# Patient Record
Sex: MALE | Race: WHITE | Employment: FULL TIME | URBAN - METROPOLITAN AREA
[De-identification: names, ages, dates, MRNs, and addresses within clinical notes are randomized per-mention and may not be internally consistent; named-entity substitution may affect disease eponyms.]

---

## 2022-02-28 ENCOUNTER — HOSPITAL ENCOUNTER (INPATIENT)
Age: 57
LOS: 1 days | Discharge: HOME | DRG: 305 | End: 2022-03-01
Attending: EMERGENCY MEDICINE | Admitting: FAMILY MEDICINE
Payer: COMMERCIAL

## 2022-02-28 ENCOUNTER — APPOINTMENT (OUTPATIENT)
Dept: GENERAL RADIOLOGY | Age: 57
DRG: 305 | End: 2022-02-28
Attending: EMERGENCY MEDICINE
Payer: COMMERCIAL

## 2022-02-28 DIAGNOSIS — I16.0 HYPERTENSIVE URGENCY, MALIGNANT: Primary | ICD-10-CM

## 2022-02-28 DIAGNOSIS — R60.9 PERIPHERAL EDEMA: ICD-10-CM

## 2022-02-28 DIAGNOSIS — R77.8 ELEVATED TROPONIN: ICD-10-CM

## 2022-02-28 DIAGNOSIS — I16.0 HYPERTENSIVE URGENCY: ICD-10-CM

## 2022-02-28 DIAGNOSIS — M79.89 SWELLING OF LOWER EXTREMITY: ICD-10-CM

## 2022-02-28 DIAGNOSIS — R06.02 SOB (SHORTNESS OF BREATH): ICD-10-CM

## 2022-02-28 DIAGNOSIS — R60.9 DEPENDENT EDEMA: ICD-10-CM

## 2022-02-28 DIAGNOSIS — I16.1 HYPERTENSIVE EMERGENCY: ICD-10-CM

## 2022-02-28 DIAGNOSIS — R06.01 ORTHOPNEA: ICD-10-CM

## 2022-02-28 DIAGNOSIS — R79.89 ELEVATED BRAIN NATRIURETIC PEPTIDE (BNP) LEVEL: ICD-10-CM

## 2022-02-28 PROBLEM — L91.8 SKIN TAG: Status: ACTIVE | Noted: 2022-02-28

## 2022-02-28 LAB
ALBUMIN SERPL-MCNC: 3.7 G/DL (ref 3.5–5)
ALBUMIN/GLOB SERPL: 1 {RATIO} (ref 1.2–3.5)
ALP SERPL-CCNC: 115 U/L (ref 50–136)
ALT SERPL-CCNC: 41 U/L (ref 12–65)
ANION GAP SERPL CALC-SCNC: 4 MMOL/L (ref 7–16)
AST SERPL-CCNC: 34 U/L (ref 15–37)
ATRIAL RATE: 99 BPM
BASOPHILS # BLD: 0.1 K/UL (ref 0–0.2)
BASOPHILS NFR BLD: 1 % (ref 0–2)
BILIRUB SERPL-MCNC: 3.8 MG/DL (ref 0.2–1.1)
BNP SERPL-MCNC: 3480 PG/ML (ref 5–125)
BUN SERPL-MCNC: 19 MG/DL (ref 6–23)
CALCIUM SERPL-MCNC: 9.1 MG/DL (ref 8.3–10.4)
CALCULATED P AXIS, ECG09: 75 DEGREES
CALCULATED R AXIS, ECG10: -20 DEGREES
CALCULATED T AXIS, ECG11: 90 DEGREES
CHLORIDE SERPL-SCNC: 105 MMOL/L (ref 98–107)
CO2 SERPL-SCNC: 30 MMOL/L (ref 21–32)
CREAT SERPL-MCNC: 1.1 MG/DL (ref 0.8–1.5)
DIAGNOSIS, 93000: NORMAL
DIFFERENTIAL METHOD BLD: ABNORMAL
EOSINOPHIL # BLD: 0.1 K/UL (ref 0–0.8)
EOSINOPHIL NFR BLD: 1 % (ref 0.5–7.8)
ERYTHROCYTE [DISTWIDTH] IN BLOOD BY AUTOMATED COUNT: 13.3 % (ref 11.9–14.6)
GLOBULIN SER CALC-MCNC: 3.6 G/DL (ref 2.3–3.5)
GLUCOSE SERPL-MCNC: 110 MG/DL (ref 65–100)
HCT VFR BLD AUTO: 47.3 % (ref 41.1–50.3)
HGB BLD-MCNC: 16.9 G/DL (ref 13.6–17.2)
IMM GRANULOCYTES # BLD AUTO: 0 K/UL (ref 0–0.5)
IMM GRANULOCYTES NFR BLD AUTO: 0 % (ref 0–5)
LYMPHOCYTES # BLD: 1.6 K/UL (ref 0.5–4.6)
LYMPHOCYTES NFR BLD: 16 % (ref 13–44)
MAGNESIUM SERPL-MCNC: 2.4 MG/DL (ref 1.8–2.4)
MCH RBC QN AUTO: 29.9 PG (ref 26.1–32.9)
MCHC RBC AUTO-ENTMCNC: 35.7 G/DL (ref 31.4–35)
MCV RBC AUTO: 83.6 FL (ref 79.6–97.8)
MONOCYTES # BLD: 0.7 K/UL (ref 0.1–1.3)
MONOCYTES NFR BLD: 7 % (ref 4–12)
NEUTS SEG # BLD: 7.5 K/UL (ref 1.7–8.2)
NEUTS SEG NFR BLD: 76 % (ref 43–78)
NRBC # BLD: 0 K/UL (ref 0–0.2)
P-R INTERVAL, ECG05: 176 MS
PLATELET # BLD AUTO: 224 K/UL (ref 150–450)
PMV BLD AUTO: 10.3 FL (ref 9.4–12.3)
POTASSIUM SERPL-SCNC: 3.5 MMOL/L (ref 3.5–5.1)
PROT SERPL-MCNC: 7.3 G/DL (ref 6.3–8.2)
Q-T INTERVAL, ECG07: 386 MS
QRS DURATION, ECG06: 106 MS
QTC CALCULATION (BEZET), ECG08: 495 MS
RBC # BLD AUTO: 5.66 M/UL (ref 4.23–5.6)
SODIUM SERPL-SCNC: 139 MMOL/L (ref 138–145)
TROPONIN-HIGH SENSITIVITY: 78.5 PG/ML (ref 0–14)
VENTRICULAR RATE, ECG03: 99 BPM
WBC # BLD AUTO: 9.9 K/UL (ref 4.3–11.1)

## 2022-02-28 PROCEDURE — 85025 COMPLETE CBC W/AUTO DIFF WBC: CPT

## 2022-02-28 PROCEDURE — 93005 ELECTROCARDIOGRAM TRACING: CPT | Performed by: EMERGENCY MEDICINE

## 2022-02-28 PROCEDURE — 96375 TX/PRO/DX INJ NEW DRUG ADDON: CPT

## 2022-02-28 PROCEDURE — 96374 THER/PROPH/DIAG INJ IV PUSH: CPT

## 2022-02-28 PROCEDURE — 80053 COMPREHEN METABOLIC PANEL: CPT

## 2022-02-28 PROCEDURE — 74011250636 HC RX REV CODE- 250/636: Performed by: FAMILY MEDICINE

## 2022-02-28 PROCEDURE — 96376 TX/PRO/DX INJ SAME DRUG ADON: CPT

## 2022-02-28 PROCEDURE — 74011000250 HC RX REV CODE- 250: Performed by: EMERGENCY MEDICINE

## 2022-02-28 PROCEDURE — 83735 ASSAY OF MAGNESIUM: CPT

## 2022-02-28 PROCEDURE — 93005 ELECTROCARDIOGRAM TRACING: CPT | Performed by: STUDENT IN AN ORGANIZED HEALTH CARE EDUCATION/TRAINING PROGRAM

## 2022-02-28 PROCEDURE — 99285 EMERGENCY DEPT VISIT HI MDM: CPT

## 2022-02-28 PROCEDURE — 74011250636 HC RX REV CODE- 250/636: Performed by: EMERGENCY MEDICINE

## 2022-02-28 PROCEDURE — 83880 ASSAY OF NATRIURETIC PEPTIDE: CPT

## 2022-02-28 PROCEDURE — 84484 ASSAY OF TROPONIN QUANT: CPT

## 2022-02-28 PROCEDURE — 65270000029 HC RM PRIVATE

## 2022-02-28 PROCEDURE — 71046 X-RAY EXAM CHEST 2 VIEWS: CPT

## 2022-02-28 RX ORDER — METOPROLOL TARTRATE 5 MG/5ML
5 INJECTION INTRAVENOUS ONCE
Status: COMPLETED | OUTPATIENT
Start: 2022-02-28 | End: 2022-02-28

## 2022-02-28 RX ORDER — SODIUM CHLORIDE 0.9 % (FLUSH) 0.9 %
5-40 SYRINGE (ML) INJECTION AS NEEDED
Status: DISCONTINUED | OUTPATIENT
Start: 2022-02-28 | End: 2022-03-01 | Stop reason: HOSPADM

## 2022-02-28 RX ORDER — ACETAMINOPHEN 325 MG/1
650 TABLET ORAL
Status: DISCONTINUED | OUTPATIENT
Start: 2022-02-28 | End: 2022-03-01 | Stop reason: HOSPADM

## 2022-02-28 RX ORDER — SODIUM CHLORIDE 0.9 % (FLUSH) 0.9 %
5-10 SYRINGE (ML) INJECTION EVERY 8 HOURS
Status: DISCONTINUED | OUTPATIENT
Start: 2022-02-28 | End: 2022-03-01 | Stop reason: HOSPADM

## 2022-02-28 RX ORDER — LISINOPRIL 5 MG/1
10 TABLET ORAL DAILY
Status: DISCONTINUED | OUTPATIENT
Start: 2022-03-01 | End: 2022-03-01

## 2022-02-28 RX ORDER — CARVEDILOL 6.25 MG/1
6.25 TABLET ORAL 2 TIMES DAILY WITH MEALS
Status: DISCONTINUED | OUTPATIENT
Start: 2022-03-01 | End: 2022-03-01

## 2022-02-28 RX ORDER — FUROSEMIDE 10 MG/ML
20 INJECTION INTRAMUSCULAR; INTRAVENOUS
Status: COMPLETED | OUTPATIENT
Start: 2022-02-28 | End: 2022-02-28

## 2022-02-28 RX ORDER — HYDRALAZINE HYDROCHLORIDE 20 MG/ML
20 INJECTION INTRAMUSCULAR; INTRAVENOUS ONCE
Status: COMPLETED | OUTPATIENT
Start: 2022-02-28 | End: 2022-02-28

## 2022-02-28 RX ORDER — ONDANSETRON 2 MG/ML
4 INJECTION INTRAMUSCULAR; INTRAVENOUS
Status: DISCONTINUED | OUTPATIENT
Start: 2022-02-28 | End: 2022-03-01 | Stop reason: HOSPADM

## 2022-02-28 RX ORDER — FUROSEMIDE 10 MG/ML
40 INJECTION INTRAMUSCULAR; INTRAVENOUS 2 TIMES DAILY
Status: DISCONTINUED | OUTPATIENT
Start: 2022-03-01 | End: 2022-03-01

## 2022-02-28 RX ORDER — SODIUM CHLORIDE 0.9 % (FLUSH) 0.9 %
5-40 SYRINGE (ML) INJECTION EVERY 8 HOURS
Status: DISCONTINUED | OUTPATIENT
Start: 2022-02-28 | End: 2022-03-01 | Stop reason: HOSPADM

## 2022-02-28 RX ORDER — ASPIRIN 81 MG/1
81 TABLET ORAL DAILY
Status: DISCONTINUED | OUTPATIENT
Start: 2022-03-01 | End: 2022-03-01 | Stop reason: HOSPADM

## 2022-02-28 RX ORDER — ENOXAPARIN SODIUM 100 MG/ML
40 INJECTION SUBCUTANEOUS DAILY
Status: DISCONTINUED | OUTPATIENT
Start: 2022-03-01 | End: 2022-03-01 | Stop reason: HOSPADM

## 2022-02-28 RX ORDER — FUROSEMIDE 10 MG/ML
40 INJECTION INTRAMUSCULAR; INTRAVENOUS 2 TIMES DAILY
Status: DISCONTINUED | OUTPATIENT
Start: 2022-02-28 | End: 2022-02-28

## 2022-02-28 RX ORDER — ACETAMINOPHEN 650 MG/1
650 SUPPOSITORY RECTAL
Status: DISCONTINUED | OUTPATIENT
Start: 2022-02-28 | End: 2022-03-01 | Stop reason: HOSPADM

## 2022-02-28 RX ORDER — SODIUM CHLORIDE 0.9 % (FLUSH) 0.9 %
5-10 SYRINGE (ML) INJECTION AS NEEDED
Status: DISCONTINUED | OUTPATIENT
Start: 2022-02-28 | End: 2022-03-01 | Stop reason: HOSPADM

## 2022-02-28 RX ORDER — POLYETHYLENE GLYCOL 3350 17 G/17G
17 POWDER, FOR SOLUTION ORAL DAILY PRN
Status: DISCONTINUED | OUTPATIENT
Start: 2022-02-28 | End: 2022-03-01 | Stop reason: HOSPADM

## 2022-02-28 RX ORDER — ONDANSETRON 4 MG/1
4 TABLET, ORALLY DISINTEGRATING ORAL
Status: DISCONTINUED | OUTPATIENT
Start: 2022-02-28 | End: 2022-03-01 | Stop reason: HOSPADM

## 2022-02-28 RX ADMIN — METOPROLOL TARTRATE 5 MG: 1 INJECTION, SOLUTION INTRAVENOUS at 21:13

## 2022-02-28 RX ADMIN — FUROSEMIDE 20 MG: 10 INJECTION, SOLUTION INTRAMUSCULAR; INTRAVENOUS at 20:12

## 2022-02-28 RX ADMIN — HYDRALAZINE HYDROCHLORIDE 20 MG: 20 INJECTION INTRAMUSCULAR; INTRAVENOUS at 23:23

## 2022-02-28 RX ADMIN — FUROSEMIDE 40 MG: 10 INJECTION, SOLUTION INTRAMUSCULAR; INTRAVENOUS at 21:45

## 2022-02-28 RX ADMIN — METOPROLOL TARTRATE 5 MG: 1 INJECTION, SOLUTION INTRAVENOUS at 20:12

## 2022-02-28 NOTE — ED TRIAGE NOTES
Pt arrives ambulatory to triage. Reports new swelling in both ankles and having some abdominal distention. Pt went to PCP today and was told his BP was 214/113, denies hx of HTN. Pt denies any headaches, blurred vision. Denies shortness of breath but states at night he has to sleep sitting up to breathe. Pt got OTC diuretic yesterday and states he felt like he was able to sleep better last night. NAD. Masked.

## 2022-03-01 ENCOUNTER — APPOINTMENT (OUTPATIENT)
Dept: ULTRASOUND IMAGING | Age: 57
DRG: 305 | End: 2022-03-01
Attending: FAMILY MEDICINE
Payer: COMMERCIAL

## 2022-03-01 ENCOUNTER — APPOINTMENT (OUTPATIENT)
Dept: NON INVASIVE DIAGNOSTICS | Age: 57
DRG: 305 | End: 2022-03-01
Attending: FAMILY MEDICINE
Payer: COMMERCIAL

## 2022-03-01 VITALS
HEART RATE: 69 BPM | RESPIRATION RATE: 16 BRPM | SYSTOLIC BLOOD PRESSURE: 152 MMHG | HEIGHT: 73 IN | OXYGEN SATURATION: 95 % | DIASTOLIC BLOOD PRESSURE: 73 MMHG | BODY MASS INDEX: 35.39 KG/M2 | WEIGHT: 267 LBS | TEMPERATURE: 98 F

## 2022-03-01 PROBLEM — E80.6 HYPERBILIRUBINEMIA: Status: ACTIVE | Noted: 2022-03-01

## 2022-03-01 PROBLEM — R77.8 ELEVATED TROPONIN: Status: ACTIVE | Noted: 2022-03-01

## 2022-03-01 PROBLEM — R79.89 ELEVATED BRAIN NATRIURETIC PEPTIDE (BNP) LEVEL: Status: ACTIVE | Noted: 2022-03-01

## 2022-03-01 PROBLEM — R60.9 PERIPHERAL EDEMA: Status: ACTIVE | Noted: 2022-03-01

## 2022-03-01 PROBLEM — R06.02 SOB (SHORTNESS OF BREATH): Status: ACTIVE | Noted: 2022-03-01

## 2022-03-01 PROBLEM — E87.70 VOLUME OVERLOAD: Status: ACTIVE | Noted: 2022-03-01

## 2022-03-01 LAB
ALBUMIN SERPL-MCNC: 2.7 G/DL (ref 3.5–5)
ALBUMIN/GLOB SERPL: 1 {RATIO} (ref 1.2–3.5)
ALP SERPL-CCNC: 75 U/L (ref 50–136)
ALT SERPL-CCNC: 25 U/L (ref 12–65)
ANION GAP SERPL CALC-SCNC: 5 MMOL/L (ref 7–16)
AST SERPL-CCNC: 34 U/L (ref 15–37)
BILIRUB DIRECT SERPL-MCNC: 0.3 MG/DL
BILIRUB DIRECT SERPL-MCNC: 0.4 MG/DL
BILIRUB INDIRECT SERPL-MCNC: 3.4 MG/DL (ref 0–1.1)
BILIRUB SERPL-MCNC: 3.1 MG/DL (ref 0.2–1.1)
BILIRUB SERPL-MCNC: 3.8 MG/DL (ref 0.2–1.1)
BUN SERPL-MCNC: 18 MG/DL (ref 6–23)
CALCIUM SERPL-MCNC: 8.6 MG/DL (ref 8.3–10.4)
CHLORIDE SERPL-SCNC: 105 MMOL/L (ref 98–107)
CHOLEST SERPL-MCNC: 169 MG/DL
CO2 SERPL-SCNC: 28 MMOL/L (ref 21–32)
CREAT SERPL-MCNC: 1 MG/DL (ref 0.8–1.5)
ECHO AR MAX VEL PISA: 4.9 M/S
ECHO AV AREA PEAK VELOCITY: 2.5 CM2
ECHO AV AREA VTI: 2.6 CM2
ECHO AV AREA/BSA PEAK VELOCITY: 1 CM2/M2
ECHO AV AREA/BSA VTI: 1.1 CM2/M2
ECHO AV CUSP MM: 2.7 CM
ECHO AV MEAN GRADIENT: 4 MMHG
ECHO AV MEAN VELOCITY: 0.9 M/S
ECHO AV PEAK GRADIENT: 9 MMHG
ECHO AV PEAK VELOCITY: 1.5 M/S
ECHO AV REGURGITANT PHT: 625 MS
ECHO AV VELOCITY RATIO: 0.53
ECHO AV VTI: 27.8 CM
ECHO EST RA PRESSURE: 8 MMHG
ECHO LA AREA 2C: 29.6 CM2
ECHO LA AREA 4C: 26.4 CM2
ECHO LA DIAMETER INDEX: 1.65 CM/M2
ECHO LA DIAMETER: 4 CM
ECHO LA MAJOR AXIS: 6.4 CM
ECHO LA MINOR AXIS: 6.9 CM
ECHO LA VOL BP: 101 ML (ref 18–58)
ECHO LA VOL/BSA BIPLANE: 42 ML/M2 (ref 16–34)
ECHO LV E' LATERAL VELOCITY: 5 CM/S
ECHO LV E' SEPTAL VELOCITY: 5 CM/S
ECHO LV EDV 3D: 310 ML
ECHO LV EDV A4C: 293 ML
ECHO LV EDV INDEX 3D: 128 ML/M2
ECHO LV EDV INDEX A4C: 121 ML/M2
ECHO LV EJECTION FRACTION 3D: 37 %
ECHO LV EJECTION FRACTION A4C: 37 %
ECHO LV ESV 3D: 197 ML
ECHO LV ESV A4C: 185 ML
ECHO LV ESV INDEX 3D: 81 ML/M2
ECHO LV ESV INDEX A4C: 76 ML/M2
ECHO LV FRACTIONAL SHORTENING: 19 % (ref 28–44)
ECHO LV INTERNAL DIMENSION DIASTOLE INDEX: 3 CM/M2
ECHO LV INTERNAL DIMENSION DIASTOLIC: 7.3 CM (ref 4.2–5.9)
ECHO LV INTERNAL DIMENSION SYSTOLIC INDEX: 2.43 CM/M2
ECHO LV INTERNAL DIMENSION SYSTOLIC: 5.9 CM
ECHO LV IVSD: 1.6 CM (ref 0.6–1)
ECHO LV MASS 2D: 667.9 G (ref 88–224)
ECHO LV MASS 3D INDEX: 171.6 G/M2
ECHO LV MASS 3D: 417 G
ECHO LV MASS INDEX 2D: 274.8 G/M2 (ref 49–115)
ECHO LV POSTERIOR WALL DIASTOLIC: 1.7 CM (ref 0.6–1)
ECHO LV RELATIVE WALL THICKNESS RATIO: 0.47
ECHO LVOT AREA: 4.5 CM2
ECHO LVOT AV VTI INDEX: 0.58
ECHO LVOT DIAM: 2.4 CM
ECHO LVOT MEAN GRADIENT: 1 MMHG
ECHO LVOT PEAK GRADIENT: 3 MMHG
ECHO LVOT PEAK VELOCITY: 0.8 M/S
ECHO LVOT STROKE VOLUME INDEX: 30 ML/M2
ECHO LVOT SV: 72.8 ML
ECHO LVOT VTI: 16.1 CM
ECHO MV A VELOCITY: 0.67 M/S
ECHO MV AREA VTI: 1.9 CM2
ECHO MV E DECELERATION TIME (DT): 201 MS
ECHO MV E VELOCITY: 0.99 M/S
ECHO MV E/A RATIO: 1.48
ECHO MV E/E' LATERAL: 19.8
ECHO MV E/E' RATIO (AVERAGED): 19.8
ECHO MV E/E' SEPTAL: 19.8
ECHO MV LVOT VTI INDEX: 2.35
ECHO MV MAX VELOCITY: 1.4 M/S
ECHO MV MEAN GRADIENT: 3 MMHG
ECHO MV MEAN VELOCITY: 0.8 M/S
ECHO MV PEAK GRADIENT: 8 MMHG
ECHO MV VTI: 37.8 CM
ECHO RIGHT VENTRICULAR SYSTOLIC PRESSURE (RVSP): 24 MMHG
ECHO RV BASAL DIMENSION: 3.5 CM
ECHO RV INTERNAL DIMENSION: 2.5 CM
ECHO RV TAPSE: 2.7 CM (ref 1.5–2)
ECHO TV REGURGITANT MAX VELOCITY: 2.01 M/S
ECHO TV REGURGITANT PEAK GRADIENT: 16 MMHG
ERYTHROCYTE [DISTWIDTH] IN BLOOD BY AUTOMATED COUNT: 13.3 % (ref 11.9–14.6)
GLOBULIN SER CALC-MCNC: 2.6 G/DL (ref 2.3–3.5)
GLUCOSE SERPL-MCNC: 138 MG/DL (ref 65–100)
HAV IGM SER QL: NONREACTIVE
HBV CORE IGM SER QL: NONREACTIVE
HBV SURFACE AG SER QL: NONREACTIVE
HCT VFR BLD AUTO: 44.7 % (ref 41.1–50.3)
HCV AB SER QL: NONREACTIVE
HDLC SERPL-MCNC: 49 MG/DL (ref 40–60)
HDLC SERPL: 3.4 {RATIO}
HGB BLD-MCNC: 16.1 G/DL (ref 13.6–17.2)
LDLC SERPL CALC-MCNC: 105.4 MG/DL
MCH RBC QN AUTO: 29.7 PG (ref 26.1–32.9)
MCHC RBC AUTO-ENTMCNC: 36 G/DL (ref 31.4–35)
MCV RBC AUTO: 82.5 FL (ref 79.6–97.8)
NRBC # BLD: 0 K/UL (ref 0–0.2)
PLATELET # BLD AUTO: 237 K/UL (ref 150–450)
PMV BLD AUTO: 10.8 FL (ref 9.4–12.3)
POTASSIUM SERPL-SCNC: 3.4 MMOL/L (ref 3.5–5.1)
PROT SERPL-MCNC: 5.3 G/DL (ref 6.3–8.2)
RBC # BLD AUTO: 5.42 M/UL (ref 4.23–5.6)
SODIUM SERPL-SCNC: 138 MMOL/L (ref 138–145)
TRIGL SERPL-MCNC: 73 MG/DL (ref 35–150)
TROPONIN-HIGH SENSITIVITY: 62.3 PG/ML (ref 0–14)
TROPONIN-HIGH SENSITIVITY: 92.3 PG/ML (ref 0–14)
VLDLC SERPL CALC-MCNC: 14.6 MG/DL (ref 6–23)
WBC # BLD AUTO: 12.7 K/UL (ref 4.3–11.1)

## 2022-03-01 PROCEDURE — 74011000258 HC RX REV CODE- 258: Performed by: FAMILY MEDICINE

## 2022-03-01 PROCEDURE — 74011000250 HC RX REV CODE- 250: Performed by: INTERNAL MEDICINE

## 2022-03-01 PROCEDURE — 80074 ACUTE HEPATITIS PANEL: CPT

## 2022-03-01 PROCEDURE — 74011250637 HC RX REV CODE- 250/637: Performed by: INTERNAL MEDICINE

## 2022-03-01 PROCEDURE — 74011250636 HC RX REV CODE- 250/636: Performed by: FAMILY MEDICINE

## 2022-03-01 PROCEDURE — 74011250637 HC RX REV CODE- 250/637: Performed by: FAMILY MEDICINE

## 2022-03-01 PROCEDURE — 74011000250 HC RX REV CODE- 250: Performed by: FAMILY MEDICINE

## 2022-03-01 PROCEDURE — 80048 BASIC METABOLIC PNL TOTAL CA: CPT

## 2022-03-01 PROCEDURE — 99223 1ST HOSP IP/OBS HIGH 75: CPT | Performed by: INTERNAL MEDICINE

## 2022-03-01 PROCEDURE — 74011250636 HC RX REV CODE- 250/636: Performed by: INTERNAL MEDICINE

## 2022-03-01 PROCEDURE — 80076 HEPATIC FUNCTION PANEL: CPT

## 2022-03-01 PROCEDURE — 80061 LIPID PANEL: CPT

## 2022-03-01 PROCEDURE — 84484 ASSAY OF TROPONIN QUANT: CPT

## 2022-03-01 PROCEDURE — 93970 EXTREMITY STUDY: CPT

## 2022-03-01 PROCEDURE — C8929 TTE W OR WO FOL WCON,DOPPLER: HCPCS

## 2022-03-01 PROCEDURE — 82248 BILIRUBIN DIRECT: CPT

## 2022-03-01 PROCEDURE — 85027 COMPLETE CBC AUTOMATED: CPT

## 2022-03-01 PROCEDURE — 76705 ECHO EXAM OF ABDOMEN: CPT

## 2022-03-01 RX ORDER — POTASSIUM CHLORIDE 750 MG/1
10 TABLET, EXTENDED RELEASE ORAL DAILY
Qty: 30 TABLET | Refills: 11 | Status: SHIPPED | OUTPATIENT
Start: 2022-03-02

## 2022-03-01 RX ORDER — FUROSEMIDE 40 MG/1
40 TABLET ORAL DAILY
Qty: 30 TABLET | Refills: 11 | Status: SHIPPED | OUTPATIENT
Start: 2022-03-01

## 2022-03-01 RX ORDER — LISINOPRIL 20 MG/1
20 TABLET ORAL DAILY
Status: DISCONTINUED | OUTPATIENT
Start: 2022-03-02 | End: 2022-03-01

## 2022-03-01 RX ORDER — NITROGLYCERIN 0.4 MG/1
0.4 TABLET SUBLINGUAL AS NEEDED
Qty: 25 TABLET | Refills: 5 | Status: SHIPPED | OUTPATIENT
Start: 2022-03-01

## 2022-03-01 RX ORDER — CARVEDILOL 12.5 MG/1
12.5 TABLET ORAL 2 TIMES DAILY WITH MEALS
Status: DISCONTINUED | OUTPATIENT
Start: 2022-03-01 | End: 2022-03-01 | Stop reason: HOSPADM

## 2022-03-01 RX ORDER — NITROGLYCERIN 0.4 MG/1
0.4 TABLET SUBLINGUAL AS NEEDED
Status: DISCONTINUED | OUTPATIENT
Start: 2022-03-01 | End: 2022-03-01 | Stop reason: HOSPADM

## 2022-03-01 RX ORDER — POTASSIUM CHLORIDE 750 MG/1
10 TABLET, EXTENDED RELEASE ORAL DAILY
Status: DISCONTINUED | OUTPATIENT
Start: 2022-03-02 | End: 2022-03-01 | Stop reason: HOSPADM

## 2022-03-01 RX ORDER — HYDRALAZINE HYDROCHLORIDE 20 MG/ML
10 INJECTION INTRAMUSCULAR; INTRAVENOUS
Status: DISCONTINUED | OUTPATIENT
Start: 2022-03-01 | End: 2022-03-01 | Stop reason: HOSPADM

## 2022-03-01 RX ORDER — LISINOPRIL 20 MG/1
20 TABLET ORAL DAILY
Status: DISCONTINUED | OUTPATIENT
Start: 2022-03-02 | End: 2022-03-01 | Stop reason: HOSPADM

## 2022-03-01 RX ORDER — LISINOPRIL 20 MG/1
40 TABLET ORAL ONCE
Status: COMPLETED | OUTPATIENT
Start: 2022-03-01 | End: 2022-03-01

## 2022-03-01 RX ORDER — CARVEDILOL 12.5 MG/1
12.5 TABLET ORAL 2 TIMES DAILY WITH MEALS
Qty: 60 TABLET | Refills: 11 | Status: SHIPPED | OUTPATIENT
Start: 2022-03-02 | End: 2022-03-14

## 2022-03-01 RX ORDER — LISINOPRIL 5 MG/1
10 TABLET ORAL ONCE
Status: COMPLETED | OUTPATIENT
Start: 2022-03-01 | End: 2022-03-01

## 2022-03-01 RX ORDER — LISINOPRIL 20 MG/1
20 TABLET ORAL DAILY
Qty: 30 TABLET | Refills: 11 | Status: SHIPPED | OUTPATIENT
Start: 2022-03-01

## 2022-03-01 RX ORDER — POTASSIUM CHLORIDE 750 MG/1
10 TABLET, EXTENDED RELEASE ORAL
Status: COMPLETED | OUTPATIENT
Start: 2022-03-01 | End: 2022-03-01

## 2022-03-01 RX ORDER — FUROSEMIDE 40 MG/1
40 TABLET ORAL DAILY
Status: DISCONTINUED | OUTPATIENT
Start: 2022-03-02 | End: 2022-03-01 | Stop reason: HOSPADM

## 2022-03-01 RX ADMIN — LISINOPRIL 10 MG: 5 TABLET ORAL at 12:05

## 2022-03-01 RX ADMIN — SODIUM CHLORIDE, PRESERVATIVE FREE 10 ML: 5 INJECTION INTRAVENOUS at 00:31

## 2022-03-01 RX ADMIN — SODIUM CHLORIDE, PRESERVATIVE FREE 10 ML: 5 INJECTION INTRAVENOUS at 14:03

## 2022-03-01 RX ADMIN — ENOXAPARIN SODIUM 40 MG: 40 INJECTION SUBCUTANEOUS at 08:00

## 2022-03-01 RX ADMIN — SODIUM CHLORIDE, PRESERVATIVE FREE 10 ML: 5 INJECTION INTRAVENOUS at 05:28

## 2022-03-01 RX ADMIN — LISINOPRIL 40 MG: 20 TABLET ORAL at 01:44

## 2022-03-01 RX ADMIN — POTASSIUM CHLORIDE 10 MEQ: 10 TABLET, EXTENDED RELEASE ORAL at 10:35

## 2022-03-01 RX ADMIN — HYDRALAZINE HYDROCHLORIDE 10 MG: 20 INJECTION, SOLUTION INTRAMUSCULAR; INTRAVENOUS at 10:35

## 2022-03-01 RX ADMIN — LISINOPRIL 10 MG: 5 TABLET ORAL at 08:00

## 2022-03-01 RX ADMIN — FUROSEMIDE 40 MG: 10 INJECTION, SOLUTION INTRAMUSCULAR; INTRAVENOUS at 08:00

## 2022-03-01 RX ADMIN — SODIUM CHLORIDE 15 MG/HR: 9 INJECTION, SOLUTION INTRAVENOUS at 02:17

## 2022-03-01 RX ADMIN — SODIUM CHLORIDE 15 MG/HR: 9 INJECTION, SOLUTION INTRAVENOUS at 00:28

## 2022-03-01 RX ADMIN — SODIUM CHLORIDE 15 MG/HR: 9 INJECTION, SOLUTION INTRAVENOUS at 01:44

## 2022-03-01 RX ADMIN — CARVEDILOL 12.5 MG: 12.5 TABLET, FILM COATED ORAL at 16:13

## 2022-03-01 RX ADMIN — CARVEDILOL 6.25 MG: 6.25 TABLET, FILM COATED ORAL at 06:39

## 2022-03-01 RX ADMIN — ASPIRIN 81 MG: 81 TABLET ORAL at 08:00

## 2022-03-01 RX ADMIN — PERFLUTREN 1 ML: 6.52 INJECTION, SUSPENSION INTRAVENOUS at 10:40

## 2022-03-01 NOTE — PROGRESS NOTES
Primary Nurse Tiffani Diallo RN and Tigist Silveira RN performed a dual skin assessment on this patient No impairment noted  Neno score is 23

## 2022-03-01 NOTE — PROGRESS NOTES
Bedside and verbal shift change report received from  [address] (offgoing nurse). Report included the following information SBAR, Kardex, ED Summary, Procedure Summary, Intake/Output, MAR, Recent Results and Dual Neuro Assessment. Dual skin assessment completed at bedside.     Dual verification of gtts completed (name of gtts verified): N/A
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
## 2022-03-01 NOTE — PROGRESS NOTES
Hospitalist Progress Note   Admit Date:  2022  6:32 PM   Name:  Caterina Lawson   Age:  60 y.o. Sex:  male  :  1965   MRN:  [id number]   Room:  [facility]/01    Presenting Complaint: Hypertension and Peripheral Edema    Reason(s) for Admission: Hypertensive emergency [I16.1]     Hospital Course & Interval History:     Caterina Lawson is a 60 y.o. male with no significant PMHx who from PCP's office with 2 weeks of peripheral edema and 3-4 day duration of SOB on exertion and at night time. Patient went to his PCP  to establish care and was found with /95. In ED, /115. He was given Lasix, metoprolol IV 5 mg x2, hydralazine 20 mg and started on Cardene gtt. Subjective/24hr Events (22):  -leg edema  -sob ,uncontrolled BP    ROS:  10 systems reviewed and negative except as noted above. Assessment & Plan:   Hypertensive emergency  Initial /115 with evidence of end-organ damage with SOB. Increase  Lisinopril  Increase  Coreg  cardene gtt  Cardiology consult appreciated       SOB  Peripheral edema  Suspect 2/2 malignant HTN, ? New-onset CHF. pBNP >3K on admission. CXR shows cardiomegaly  Low sodium diet. Fluid restrictions <1.5L/day. Elevate b/l LE's  Lasix IV 40mg IV BID  Monitor renal function  Accurate I&O's. Low salt diet. Fluid restrictions <1.5L   TTE pending  Check venous duplex b/l LE's       Elevated Troponin  HS-Trop on admission 78.5>>92. EKG on admission. Most likely 2/2 Type 2 demand ischemia due to malignant HTN. Monitor pt on telemetry  HAJA-B therapy  Trend HS-trop  Check TTE  Consult Cardiology, appreciate recs     Hyperbilirubinemia  Tbili 3.8 on admission (no baseline). ALT and AST w/o. Quit alcohol use >20 years ago.   Check hepatitis panel RUQ US  Check fractionated bilirubin        Principal Problem:       Dispo/Discharge Planning:      home    Diet:  ADULT DIET Regular; Low Fat/Low Chol/High Fiber/BRANDEN; No Salt Added (3-4 gm); 1500 ml  DVT PPx: lovenox  Code status: Full Code    Hospital Problems as of 3/1/2022 Never Reviewed          Codes Class Noted - Resolved POA    SOB (shortness of breath) ICD-10-CM: R06.02  ICD-9-CM: 786.05  3/1/2022 - Present Unknown        Elevated troponin ICD-10-CM: R77.8  ICD-9-CM: 790.6  3/1/2022 - Present Unknown        Hyperbilirubinemia ICD-10-CM: E80.6  ICD-9-CM: 782.4  3/1/2022 - Present Unknown        Peripheral edema ICD-10-CM: R60.9  ICD-9-CM: 782.3  3/1/2022 - Present Unknown        Elevated brain natriuretic peptide (BNP) level ICD-10-CM: R79.89  ICD-9-CM: 790.99  3/1/2022 - Present Unknown        Volume overload ICD-10-CM: E87.70  ICD-9-CM: 276.69  3/1/2022 - Present Unknown        * (Principal) Hypertensive emergency ICD-10-CM: I16.1  ICD-9-CM: 401.9  2/28/2022 - Present Unknown              Objective:     Patient Vitals for the past 24 hrs:   Temp Pulse Resp BP SpO2   03/01/22 1502 98 °F (36.7 °C) 77 16 (!) 161/72 97 %   03/01/22 1432 -- 75 -- -- 96 %   03/01/22 1347 -- 76 -- (!) 150/63 96 %   03/01/22 1317 -- 78 -- (!) 159/75 97 %   03/01/22 1302 -- 77 -- (!) 161/69 96 %   03/01/22 1257 -- 70 -- -- 96 %   03/01/22 1217 -- 71 -- (!) 163/78 97 %   03/01/22 1202 -- 78 -- (!) 161/73 96 %   03/01/22 1147 -- 73 -- (!) 158/76 96 %   03/01/22 1135 -- -- -- (!) 165/78 --   03/01/22 1117 98.4 °F (36.9 °C) 70 17 (!) 158/74 95 %   03/01/22 1102 -- 76 -- (!) 152/70 96 %   03/01/22 1048 -- 75 -- (!) 163/88 97 %   03/01/22 1039 -- 73 -- (!) 164/88 97 %   03/01/22 1033 -- 70 -- (!) 168/86 97 %   03/01/22 1013 -- 72 -- -- 96 %   03/01/22 0954 -- 72 -- -- 96 %   03/01/22 0947 -- 65 -- (!) 158/79 95 %   03/01/22 0933 -- 71 -- (!) 157/80 96 %   03/01/22 0917 -- 74 -- (!) 145/75 97 %   03/01/22 0902 -- 64 -- (!) 162/77 94 %   03/01/22 0847 -- 72 -- (!) 172/81 95 %   03/01/22 0834 -- 74 -- (!) 172/91 95 %   03/01/22 0816 -- 70 -- (!) 150/76 96 %   03/01/22 0803 -- 70 -- (!) 146/77 97 %   03/01/22 0750 -- 73 -- (!) 172/82 96 % 03/01/22 0733 -- 79 -- (!) 169/76 97 %   03/01/22 0703 98 °F (36.7 °C) 73 18 (!) 168/77 96 %   03/01/22 0643 -- 82 -- (!) 165/78 94 %   03/01/22 0617 -- 72 -- (!) 158/77 95 %   03/01/22 0602 -- 67 16 (!) 152/66 93 %   03/01/22 0547 -- 76 -- (!) 159/85 95 %   03/01/22 0532 -- 72 -- (!) 158/73 96 %   03/01/22 0517 -- 76 -- (!) 152/72 95 %   03/01/22 0447 -- 76 12 (!) 152/81 96 %   03/01/22 0432 -- 77 -- (!) 150/70 95 %   03/01/22 0417 -- 74 -- (!) 149/66 94 %   03/01/22 0351 -- 77 -- -- --   03/01/22 0347 -- 77 -- (!) 151/68 95 %   03/01/22 0332 -- 79 -- (!) 152/63 96 %   03/01/22 0317 98.8 °F (37.1 °C) 85 20 (!) 154/58 95 %   03/01/22 0302 -- 85 -- (!) 165/67 95 %   03/01/22 0247 -- 84 -- (!) 159/73 95 %   03/01/22 0232 -- 84 -- (!) 155/62 95 %   03/01/22 0217 -- 88 -- (!) 157/71 96 %   03/01/22 0202 -- 87 -- (!) 173/78 95 %   03/01/22 0147 -- 92 -- (!) 183/84 95 %   03/01/22 0132 -- 85 -- (!) 179/84 96 %   03/01/22 0051 98.7 °F (37.1 °C) 83 18 (!) 177/81 96 %   03/01/22 0015 -- 75 -- (!) 195/93 97 %   03/01/22 0000 -- 84 -- -- --   02/28/22 2323 -- 79 -- (!) 202/112 --   02/28/22 2249 -- 77 22 (!) 201/124 --   02/28/22 2234 -- 87 22 (!) 183/125 --   02/28/22 2219 -- 74 22 (!) 188/107 --   02/28/22 2204 -- 76 22 (!) 196/117 --   02/28/22 2149 -- 74 22 (!) 191/119 --   02/28/22 2134 -- 77 (!) 34 (!) 185/106 95 %   02/28/22 2119 -- 70 13 -- 95 %   02/28/22 2113 -- 75 -- (!) 195/109 --   02/28/22 2104 -- 76 14 (!) 195/109 94 %   02/28/22 2049 -- 74 18 (!) 196/122 95 %   02/28/22 2034 -- 75 15 (!) 217/123 95 %   02/28/22 2019 -- 79 13 -- 96 %   02/28/22 2012 -- 91 -- (!) 195/112 --   02/28/22 2004 -- 85 16 (!) 195/112 95 %   02/28/22 1949 -- 86 18 -- 94 %   02/28/22 1934 -- 90 17 (!) 235/117 95 %   02/28/22 1919 -- 90 17 -- 96 %   02/28/22 1904 -- 88 22 -- 96 %   02/28/22 1900 -- 91 22 (!) 208/124 96 %   02/28/22 1803 -- 81 -- (!) 212/112 99 %   02/28/22 1611 98.7 °F (37.1 °C) 99 16 (!) 211/115 99 %     Oxygen Therapy  O2 Sat (%): 97 % (03/01/22 1502)  Pulse via Oximetry: 85 beats per minute (03/01/22 0643)  O2 Device: None (Room air) (03/01/22 1502)    Estimated body mass index is 35.23 kg/m² as calculated from the following:    Height as of this encounter: 6' 1\" (1.854 m). Weight as of this encounter: 121.1 kg (267 lb). Intake/Output Summary (Last 24 hours) at 3/1/2022 1530  Last data filed at 3/1/2022 1507  Gross per 24 hour   Intake 838 ml   Output 3775 ml   Net -2937 ml         Physical Exam:     Blood pressure (!) 161/72, pulse 77, temperature 98 °F (36.7 °C), resp. rate 16, height 6' 1\" (1.854 m), weight 121.1 kg (267 lb), SpO2 97 %. General:    Well nourished. No overt distress  Head:  Normocephalic, atraumatic  Eyes:  Sclerae appear normal.  Pupils equally round. ENT:  Nares appear normal, no drainage. Moist oral mucosa  Neck:  No restricted ROM. Trachea midline   CV:   RRR. No m/r/g. No jugular venous distension. Lungs:   CTAB. No wheezing, rhonchi, or rales. Respirations even, unlabored  Abdomen: Bowel sounds present. Soft, nontender, nondistended. Extremities: No cyanosis or clubbing.  ++ edema  Skin:     No rashes and normal coloration. Warm and dry. Neuro:  CN II-XII grossly intact. Sensation intact. A&Ox3  Psych:  Normal mood and affect.       I have reviewed ordered lab tests and independently visualized imaging below:    Recent Labs:  Recent Results (from the past 48 hour(s))   HEMOGLOBIN A1C WITH EAG    Collection Time: 02/28/22  3:35 PM   Result Value Ref Range    Hemoglobin A1c 5.4 4.8 - 5.6 %    Estimated average glucose 877 mg/dL   METABOLIC PANEL, COMPREHENSIVE    Collection Time: 02/28/22  3:35 PM   Result Value Ref Range    Glucose 113 (H) 65 - 99 mg/dL    BUN 17 6 - 24 mg/dL    Creatinine 1.13 0.76 - 1.27 mg/dL    eGFR 75 >59 mL/min/1.73    BUN/Creatinine ratio 15 9 - 20    Sodium 141 134 - 144 mmol/L    Potassium 4.3 3.5 - 5.2 mmol/L    Chloride 100 96 - 106 mmol/L CO2 25 20 - 29 mmol/L    Calcium 9.3 8.7 - 10.2 mg/dL    Protein, total 6.4 6.0 - 8.5 g/dL    Albumin 4.5 3.8 - 4.9 g/dL    GLOBULIN, TOTAL 1.9 1.5 - 4.5 g/dL    A-G Ratio 2.4 (H) 1.2 - 2.2    Bilirubin, total 3.2 (H) 0.0 - 1.2 mg/dL    Alk. phosphatase 109 44 - 121 IU/L    AST (SGOT) 26 0 - 40 IU/L    ALT (SGPT) 27 0 - 44 IU/L   CBC WITH AUTOMATED DIFF    Collection Time: 02/28/22  3:35 PM   Result Value Ref Range    WBC 9.0 3.4 - 10.8 x10E3/uL    RBC 5.60 4.14 - 5.80 x10E6/uL    HGB 16.9 13.0 - 17.7 g/dL    HCT 49.5 37.5 - 51.0 %    MCV 88 79 - 97 fL    MCH 30.2 26.6 - 33.0 pg    MCHC 34.1 31.5 - 35.7 g/dL    RDW 13.2 11.6 - 15.4 %    PLATELET 800 504 - 161 x10E3/uL    NEUTROPHILS 75 Not Estab. %    Lymphocytes 16 Not Estab. %    MONOCYTES 7 Not Estab. %    EOSINOPHILS 1 Not Estab. %    BASOPHILS 1 Not Estab. %    ABS. NEUTROPHILS 6.8 1.4 - 7.0 x10E3/uL    Abs Lymphocytes 1.4 0.7 - 3.1 x10E3/uL    ABS. MONOCYTES 0.6 0.1 - 0.9 x10E3/uL    ABS. EOSINOPHILS 0.1 0.0 - 0.4 x10E3/uL    ABS. BASOPHILS 0.1 0.0 - 0.2 x10E3/uL    IMMATURE GRANULOCYTES 0 Not Estab. %    ABS. IMM.  GRANS. 0.0 0.0 - 0.1 x10E3/uL   LIPID PANEL    Collection Time: 02/28/22  3:35 PM   Result Value Ref Range    Cholesterol, total 184 100 - 199 mg/dL    Triglyceride 108 0 - 149 mg/dL    HDL Cholesterol 45 >39 mg/dL    VLDL, calculated 20 5 - 40 mg/dL    LDL, calculated 119 (H) 0 - 99 mg/dL   PSA, DIAGNOSTIC (PROSTATE SPECIFIC AG)    Collection Time: 02/28/22  3:35 PM   Result Value Ref Range    Prostate Specific Ag 1.8 0.0 - 4.0 ng/mL   TROPONIN T    Collection Time: 02/28/22  3:35 PM   Result Value Ref Range    Troponin T(Highly Sensitive) 51 (HH) 0 - 22 ng/L   NT-PRO BNP    Collection Time: 02/28/22  3:35 PM   Result Value Ref Range    PROBNP 2,702 (H) 0 - 210 pg/mL   URINALYSIS W/MICROSCOPIC    Collection Time: 02/28/22  3:42 PM   Result Value Ref Range    Specific Gravity 1.025 1.005 - 1.030    pH (UA) 6.0 5.0 - 7.5    Color Yellow Yellow Appearance Clear Clear    Leukocyte Esterase Negative Negative    Protein 2+ (A) Negative/Trace    Glucose Negative Negative    Ketone Trace (A) Negative    Blood Negative Negative    Bilirubin Negative Negative    Urobilinogen 1.0 0.2 - 1.0 mg/dL    Nitrites Negative Negative    Microscopic Examination See additional order    MICROSCOPIC EXAMINATION    Collection Time: 02/28/22  3:42 PM   Result Value Ref Range    WBC 0-5 0 - 5 /hpf    RBC 3-10 (A) 0 - 2 /hpf    Epithelial cells None seen 0 - 10 /hpf    Casts None seen None seen /lpf    Crystals Present (A) N/A    Crystal type Calcium Oxalate N/A    Bacteria None seen None seen/Few   CBC WITH AUTOMATED DIFF    Collection Time: 02/28/22  4:12 PM   Result Value Ref Range    WBC 9.9 4.3 - 11.1 K/uL    RBC 5.66 (H) 4.23 - 5.6 M/uL    HGB 16.9 13.6 - 17.2 g/dL    HCT 47.3 41.1 - 50.3 %    MCV 83.6 79.6 - 97.8 FL    MCH 29.9 26.1 - 32.9 PG    MCHC 35.7 (H) 31.4 - 35.0 g/dL    RDW 13.3 11.9 - 14.6 %    PLATELET 023 366 - 390 K/uL    MPV 10.3 9.4 - 12.3 FL    ABSOLUTE NRBC 0.00 0.0 - 0.2 K/uL    DF AUTOMATED      NEUTROPHILS 76 43 - 78 %    LYMPHOCYTES 16 13 - 44 %    MONOCYTES 7 4.0 - 12.0 %    EOSINOPHILS 1 0.5 - 7.8 %    BASOPHILS 1 0.0 - 2.0 %    IMMATURE GRANULOCYTES 0 0.0 - 5.0 %    ABS. NEUTROPHILS 7.5 1.7 - 8.2 K/UL    ABS. LYMPHOCYTES 1.6 0.5 - 4.6 K/UL    ABS. MONOCYTES 0.7 0.1 - 1.3 K/UL    ABS. EOSINOPHILS 0.1 0.0 - 0.8 K/UL    ABS. BASOPHILS 0.1 0.0 - 0.2 K/UL    ABS. IMM.  GRANS. 0.0 0.0 - 0.5 K/UL   METABOLIC PANEL, COMPREHENSIVE    Collection Time: 02/28/22  4:12 PM   Result Value Ref Range    Sodium 139 138 - 145 mmol/L    Potassium 3.5 3.5 - 5.1 mmol/L    Chloride 105 98 - 107 mmol/L    CO2 30 21 - 32 mmol/L    Anion gap 4 (L) 7 - 16 mmol/L    Glucose 110 (H) 65 - 100 mg/dL    BUN 19 6 - 23 MG/DL    Creatinine 1.10 0.8 - 1.5 MG/DL    GFR est AA >60 >60 ml/min/1.73m2    GFR est non-AA >60 >60 ml/min/1.73m2    Calcium 9.1 8.3 - 10.4 MG/DL    Bilirubin, total 3.8 (H) 0.2 - 1.1 MG/DL    ALT (SGPT) 41 12 - 65 U/L    AST (SGOT) 34 15 - 37 U/L    Alk.  phosphatase 115 50 - 136 U/L    Protein, total 7.3 6.3 - 8.2 g/dL    Albumin 3.7 3.5 - 5.0 g/dL    Globulin 3.6 (H) 2.3 - 3.5 g/dL    A-G Ratio 1.0 (L) 1.2 - 3.5     MAGNESIUM    Collection Time: 02/28/22  4:12 PM   Result Value Ref Range    Magnesium 2.4 1.8 - 2.4 mg/dL   NT-PRO BNP    Collection Time: 02/28/22  4:12 PM   Result Value Ref Range    NT pro-BNP 3,480 (H) 5 - 125 PG/ML   EKG, 12 LEAD, INITIAL    Collection Time: 02/28/22  4:12 PM   Result Value Ref Range    Ventricular Rate 99 BPM    Atrial Rate 99 BPM    P-R Interval 176 ms    QRS Duration 106 ms    Q-T Interval 386 ms    QTC Calculation (Bezet) 495 ms    Calculated P Axis 75 degrees    Calculated R Axis -20 degrees    Calculated T Axis 90 degrees    Diagnosis       !!! Poor data quality, interpretation may be adversely affected  Normal sinus rhythm  Right atrial enlargement  Moderate voltage criteria for LVH, may be normal variant ( Sokolow-Moct[place]uma ,   [place] product )  ST & T wave abnormality, consider lateral ischemia  Prolonged QT  Abnormal ECG  No previous ECGs available  Confirmed by Christiano Day MD (), JJ PALMA ([id number]) on 2/28/2022 5:02:25 PM     TROPONIN-HIGH SENSITIVITY    Collection Time: 02/28/22  4:18 PM   Result Value Ref Range    Troponin-High Sensitivity 78.5 (H) 0 - 14 pg/mL   TROPONIN-HIGH SENSITIVITY    Collection Time: 02/28/22 11:25 PM   Result Value Ref Range    Troponin-High Sensitivity 92.3 (H) 0 - 14 pg/mL   HEPATITIS PANEL, ACUTE    Collection Time: 03/01/22 12:45 AM   Result Value Ref Range    Hepatitis A, IgM NONREACTIVE NR      Hepatitis B core, IgM NONREACTIVE NR      Hep B Surface Ag NONREACTIVE NR      Hepatitis C virus Ab NONREACTIVE NR     BILIRUBIN, FRACTIONATED    Collection Time: 03/01/22 12:47 AM   Result Value Ref Range    Bilirubin, total 3.8 (H) 0.2 - 1.1 MG/DL    Bilirubin, direct 0.4 (H) <0.4 MG/DL    Bilirubin, indirect 3.4 (H) 0.0 - 1.1 MG/DL   METABOLIC PANEL, BASIC    Collection Time: 03/01/22  2:56 AM   Result Value Ref Range    Sodium 138 138 - 145 mmol/L    Potassium 3.4 (L) 3.5 - 5.1 mmol/L    Chloride 105 98 - 107 mmol/L    CO2 28 21 - 32 mmol/L    Anion gap 5 (L) 7 - 16 mmol/L    Glucose 138 (H) 65 - 100 mg/dL    BUN 18 6 - 23 MG/DL    Creatinine 1.00 0.8 - 1.5 MG/DL    GFR est AA >60 >60 ml/min/1.73m2    GFR est non-AA >60 >60 ml/min/1.73m2    Calcium 8.6 8.3 - 10.4 MG/DL   CBC W/O DIFF    Collection Time: 03/01/22  2:56 AM   Result Value Ref Range    WBC 12.7 (H) 4.3 - 11.1 K/uL    RBC 5.42 4.23 - 5.6 M/uL    HGB 16.1 13.6 - 17.2 g/dL    HCT 44.7 41.1 - 50.3 %    MCV 82.5 79.6 - 97.8 FL    MCH 29.7 26.1 - 32.9 PG    MCHC 36.0 (H) 31.4 - 35.0 g/dL    RDW 13.3 11.9 - 14.6 %    PLATELET 759 191 - 587 K/uL    MPV 10.8 9.4 - 12.3 FL    ABSOLUTE NRBC 0.00 0.0 - 0.2 K/uL   TROPONIN-HIGH SENSITIVITY    Collection Time: 03/01/22  2:56 AM   Result Value Ref Range    Troponin-High Sensitivity 62.3 (H) 0 - 14 pg/mL   LIPID PANEL    Collection Time: 03/01/22  2:56 AM   Result Value Ref Range    Cholesterol, total 169 <200 MG/DL    Triglyceride 73 35 - 150 MG/DL    HDL Cholesterol 49 40 - 60 MG/DL    LDL, calculated 105.4 (H) <100 MG/DL    VLDL, calculated 14.6 6.0 - 23.0 MG/DL    CHOL/HDL Ratio 3.4     HEPATIC FUNCTION PANEL    Collection Time: 03/01/22  5:03 AM   Result Value Ref Range    Protein, total 5.3 (L) 6.3 - 8.2 g/dL    Albumin 2.7 (L) 3.5 - 5.0 g/dL    Globulin 2.6 2.3 - 3.5 g/dL    A-G Ratio 1.0 (L) 1.2 - 3.5      Bilirubin, total 3.1 (H) 0.2 - 1.1 MG/DL    Bilirubin, direct 0.3 <0.4 MG/DL    Alk.  phosphatase 75 50 - 136 U/L    AST (SGOT) 34 15 - 37 U/L    ALT (SGPT) 25 12 - 65 U/L   ECHO ADULT COMPLETE    Collection Time: 03/01/22 10:45 AM   Result Value Ref Range    LA Minor Axis 6.9 cm    LA Major Axis 6.4 cm    LA Area 2C 29.6 cm2    LA Area 4C 26.4 cm2    LA Volume  (A) 18 - 58 mL    LA Diameter 4.0 cm    LV EDV A4C 293 mL    LV EDV 3D 310 mL    LV ESV A4C 185 mL    LV ESV 3D 197 mL    EF 3D 37 %    LV Ejection Fraction A4C 37 %    LVOT SV 72.8 ml    LV Mass 3D 417.0 g    IVSd 1.6 (A) 0.6 - 1.0 cm    LVIDd 7.3 (A) 4.2 - 5.9 cm    LVIDs 5.9 cm    LVOT Diameter 2.4 cm    LVOT Mean Gradient 1 mmHg    LVOT VTI 16.1 cm    LVOT Peak Velocity 0.8 m/s    LVOT Peak Gradient 3 mmHg    LVPWd 1.7 (A) 0.6 - 1.0 cm    LV E' Lateral Velocity 5 cm/s    LV E' Septal Velocity 5 cm/s    LVOT Area 4.5 cm2    AR .0 ms    AR Max Velocity PISA 4.9 m/s    AV Peak Velocity 1.5 m/s    AV Peak Gradient 9 mmHg    AV Cusp Mmode 2.7 cm    AV Mean Velocity 0.9 m/s    AV Mean Gradient 4 mmHg    AV VTI 27.8 cm    AV Area by VTI 2.6 cm2    AV Area by Peak Velocity 2.5 cm2    MV E Wave Deceleration Time 201.0 ms    MV A Velocity 0.67 m/s    MV E Velocity 0.99 m/s    MV Mean Gradient 3 mmHg    MV VTI 37.8 cm    MV Mean Velocity 0.8 m/s    MV Max Velocity 1.4 m/s    MV Peak Gradient 8 mmHg    MV Area by VTI 1.9 cm2    RVIDd 2.5 cm    RV Basal Dimension 3.5 cm    TAPSE 2.7 (A) 1.5 - 2.0 cm    TR Max Velocity 2.01 m/s    TR Peak Gradient 16 mmHg    Fractional Shortening 2D 19 28 - 44 %    LV ESV Index 3D 81 mL/m2    LV EDV Index 3D 128 mL/m2    LV ESV Index A4C 76 mL/m2    LV EDV Index A4C 121 mL/m2    LVIDd Index 3.00 cm/m2    LVIDs Index 2.43 cm/m2    LV RWT Ratio 0.47     LV Mass 2D 667.9 (A) 88 - 224 g    LV Mass 2D Index 274.8 (A) 49 - 115 g/m2    LV Mass Index 3D 171.6 g/m2    MV E/A 1.48     E/E' Ratio (Averaged) 19.80     E/E' Lateral 19.80     E/E' Septal 19.80     LA Volume Index BP 42 (A) 16 - 34 ml/m2    LVOT Stroke Volume Index 30.0 mL/m2    LA Size Index 1.65 cm/m2    AV Velocity Ratio 0.53     LVOT:AV VTI Index 0.58     JARON/BSA VTI 1.1 cm2/m2    JARON/BSA Peak Velocity 1.0 cm2/m2    MV:LVOT VTI Index 2.35     Est. RA Pressure 8 mmHg    RVSP 24 mmHg       All Micro Results     None          Other Studies:  XR CHEST PA LAT    Result Date: 2/28/2022  PA LATERAL CHEST  2/28/2022 6:41 PM HISTORY:  SOB  ; SOB COMPARISON: 2/28/2022, 2 hours prior FINDINGS:  The heart size is enlarged. There is no lobar consolidation, pleural effusions or pulmonary edema. Cardiomegaly. XR CHEST PA LAT    Result Date: 2/28/2022  CHEST X-RAY, 2 views. INDICATION:  Shortness of breath during sleep. TECHNIQUE: PA and lateral views. COMPARISON: None. FINDINGS: Lungs: are clear. Costophrenic angles: are sharp. Heart size: Enlarged, CT ratio 21/35. Pulmonary vasculature: is unremarkable. Aorta: Unremarkable. Included portion of the upper abdomen: is unremarkable. Bones: No gross bony lesions. Other: None. Cardiomegaly with cardiothoracic ratio 21/35. No acute congestive heart failure. US ABD LTD    Result Date: 3/1/2022  Limited ultrasound abdomen INDICATION: Elevated bilirubin COMPARISON: none TECHNIQUE: Sonographic grayscale imaging of the abdomen was performed. FINDINGS: Liver is normal in echogenicity, contour and size and measures 15.3 cm. Right kidney is unremarkable without hydronephrosis and measures 12.5 cm. There is no gallbladder wall thickening, pericholecystic fluid or gallstone. No sonographic Carter's sign. No intra or extrahepatic biliary ductal dilatation. Common bile duct measures 4 mm. Pancreas and abdominal aorta are not well seen due to overlying bowel gas. IVC is patent. No evidence of ascites. 1. No cholelithiasis or sonographic evidence of acute cholecystitis. No biliary ductal dilatation. 2. Liver appears within normal limits, by ultrasound imaging. DUPLEX LOWER EXT VENOUS BILAT    Result Date: 3/1/2022  Bilateral lower extremity duplex venous doppler ultrasound INDICATION: Peripheral edema. Evaluate for DVT.  TECHNIQUE: Gray-scale ultrasound with and without compression and color Doppler evaluation were performed of the deep veins of bilateral lower extremities from the level of the common femoral veins to the level of the peroneal and posterior tibial veins. FINDINGS: Bilateral common femoral veins,  femoral veins, posterior tibial and peroneal veins, and popliteal veins are compressible and opacify with color at color Doppler evaluation. There is no evidence of deep vein thrombosis. No evidence of deep venous thrombosis. ECHO ADULT COMPLETE    Result Date: 3/1/2022    Left Ventricle: Left ventricle is moderately dilated. Moderately increased wall thickness. Moderate global hypokinesis present. Moderately reduced left ventricular systolic function with a visually estimated EF of 30 - 35%. Abnormal diastolic function.   Aortic Valve: Moderate transvalvular regurgitation.   Mitral Valve: Mild to moderate transvalvular regurgitation.   Left Atrium: Left atrium is moderately dilated.   Aorta: Mildly dilated aortic root. Mildly dilated ascending aorta.   Technical qualifiers: Echo study was technically difficult due to patient's body habitus.        Current Meds:  Current Facility-Administered Medications   Medication Dose Route Frequency    nitroglycerin (NITROSTAT) tablet 0.4 mg  0.4 mg SubLINGual PRN    carvediloL (COREG) tablet 12.5 mg  12.5 mg Oral BID WITH MEALS    hydrALAZINE (APRESOLINE) 20 mg/mL injection 10 mg  10 mg IntraVENous Q6H PRN    [START ON 3/2/2022] lisinopriL (PRINIVIL, ZESTRIL) tablet 30 mg  30 mg Oral DAILY    sodium chloride (NS) flush 5-10 mL  5-10 mL IntraVENous Q8H    sodium chloride (NS) flush 5-10 mL  5-10 mL IntraVENous PRN    aspirin delayed-release tablet 81 mg  81 mg Oral DAILY    sodium chloride (NS) flush 5-40 mL  5-40 mL IntraVENous Q8H    sodium chloride (NS) flush 5-40 mL  5-40 mL IntraVENous PRN    acetaminophen (TYLENOL) tablet 650 mg  650 mg Oral Q6H PRN    Or    acetaminophen (TYLENOL) suppository 650 mg  650 mg Rectal Q6H PRN    polyethylene glycol (MIRALAX) packet 17 g  17 g Oral DAILY PRN    ondansetron (ZOFRAN ODT) tablet 4 mg  4 mg Oral Q8H PRN    Or    [facility]ron [facility]) injection 4 mg  4 mg IntraVENous Q6H PRN    enoxaparin (LOVENOX) injection 40 mg  40 mg SubCUTAneous DAILY    furosemide (LASIX) injection 40 mg  40 mg IntraVENous BID       Signed:  Aliya Hernandez MD    Part of this note may have been written by using a voice dictation software. The note has been proof read but may still contain some grammatical/other typographical errors.
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
## 2022-03-01 NOTE — PROGRESS NOTES
Pt received from ED. Pt arrived with pt transport with no cardiac monitor or RN. WWK=978 and cardene gtt had not been started. Pt alert and orientedx4. Pt able to ambulate.
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
## 2022-03-01 NOTE — ADT AUTH CERT NOTES
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 Doppler: negative for DVT   T prot 5.3, Alb 2.7, T bili 3.8, 3.1, WBC 12.7, Trop high sensitivity 62.3, .4, K 3.4, Anion gap 5, Glu 138, D bili 0.4, Indirect bili 3.4   ASA 81mg PO QD, Lovenox 40mg QD, Lasix 40mg IV BID, 20mg IV x 1, Cardene 50mg/100cc gtt titrated, Coreg 12.5mg BID, 6.25mg x 1, Apresoline 10mg IV x 1, Lisinopril 10mg x 2, 40mg x 1, KCL 10meq PO x 1, Lopressor 5mg IV x 2   Orders: Card following, regular low fat/chol high fiber BRANDEN diet, oximetry spot check prn, Echo pending, I&O, daily weight, OOB with assistance, 1500cc fluid restriction   Cardiology progress note 3/1/22:   Reason for cardiac evaluation: volume overload, HTN emergency, ?new onset CHF           He is a 60 y.o. male with no significant PMHx but borderline B/Ps in past. Patient went see his new PCP d/t  2 weeks of peripheral edema, abd swelling and SOB. He reports SOB more  at night and would wake him up. Several times he would be sleeping and wake up wheezing and feeling like fluid in chest. Did experience some chest soreness after coughing spell one night. At PCP's office his B/P was  185/95. [facility] was given Rx for HCTZ, lasix, and lisinopril then sent to ED for further care to bring down B/P. He reported family history of MI in his father who passed away in his 45s. [facility] had no prior cardiac work-up.        In ED, /115. Labs showed  proBNP 3480.  Trop-HS 78.5.  CXR shows cardiomegaly without acute CHF.  US with 2+ protein. He was given Lasix, metoprolol IV 5 mg x2, hydralazine 20 mg and started on Cardene gtt. Hospitalist admitted to ICU for HTN emergency. He was continued on cardene gtt and IV lasix. Echo ordered. Cardiology was consulted for volume overload, HTN emergency and ?new onset CHF.  He has diuresed 1.6 liters overnight. His repeat trop were 92.3 then 62. 3.        General: Well Developed, Well Nourished, No Acute Distress   HEENT: pupils equal and round, no abnormalities noted   Neck: supple, no JVD, no carotid bruits   Heart: S1S2 with RRR without murmurs or gallops   Lungs: Clear throughout auscultation bilaterally without adventitious sounds   Abd: soft, nontender, nondistended, with good bowel sounds   Ext: warm, 1+ bilateral ankle edema, calves supple/nontender, pulses 2+ bilaterally   Skin: warm and dry   Psychiatric: Normal mood and affect   Neurologic: Alert and oriented X 3       Cardiographics       Telemetry: SR 70s   ECG: Normal sinus rhythm    Right atrial enlargement    Moderate voltage criteria for LVH   Echocardiogram: pending    Assessment/Plan:        Assessment:       Principal Problem:     Hypertensive emergency (2/28/2022)- cardene gtt off; B/P improved. Continue coreg, ACE and lasix. Chest xray with cardiomegaly.  Further recommendations post echo.        Active Problems:     SOB (shortness of breath) (3/1/2022)- improved; diuresed 1.6 liters overnight.          Elevated troponin (3/1/2022)- mildly elevated; denies any chest pain except after coughing spell. Echo as above.          Hyperbilirubinemia (3/1/2022)         Peripheral edema (3/1/2022)- see above         Elevated brain natriuretic peptide (BNP) level (3/1/2022)- see above         Volume overload (3/1/2022)- from ? uncontrolled HTN/diastolic issues, systolic issues, or right sided issues ? ALANA. Continue coreg, lisinopril and IV lasix. (reports gasping for breath at night, ?sleep eval needed outpatient.  Further recommendations post echo.               Hypertension - Care Day 1 (2/28/2022) by Rashida Meehan       Review Entered Review Status   3/1/2022 10:19 Completed      Criteria Review      Care Day: 1 Care Date: 2/28/2022 Level of Care: ICU    Guideline Day 1    Level Of Care    (X) ICU, intermediate care, or floor    3/1/2022 10:19:17 EST by Rashida Meehan      ICU    Clinical Status    (X) * Clinical Indications met    (X) Possible chest pain, dyspnea, blurred vision, change in sensorium    3/1/2022 10:19:17 EST by Rashida Meehan      SOB with exertion    Routes    (X) Oral diet as tolerated    3/1/2022 10:19:17 EST by Rashida Meehan      regular low fat/chol high fiber BRANDEN diet    Interventions    (X) Possible cardiac monitoring    Medications    (X) Oral or parenteral antihypertensives    3/1/2022 10:19:17 EST by Rashida Meehan      Coreg, Apresoline & Lopressor IV, Cardene gtt    * Milestone   Additional Notes   Inpatient admission to ICU 2/28/22   HTN emergency   Wt: 121.1kg   98.7-90-/117, 96% on RA   CXR: Cardiomegaly   Trop high sensitivity 78.5, 92.3, Anion gap 4, Glu 110, T bili 3.8, BNP 3480   ASA 81mg PO QD, Lovenox 40mg QD, Lasix 40mg IV BID, 20mg IV x 1, Cardene 50mg/100cc gtt titrated, Coreg 12.5mg BID, Apresoline 20mg IV x 1, Lisinopril 40mg x 1, Lopressor 5mg IV x 2   Orders: Card consult, regular low fat/chol high fiber BRANDEN diet, oximetry spot check prn, Echo pending, I&O, daily weight, OOB with assistance, 1500cc fluid restriction   Presenting Complaint: Hypertension and Peripheral Edema       Reason(s) for Admission: Hypertensive emergency [I16.1]        History of Present Illness:   He is a 60 y.o. male with no significant PMHx who from PCP's office with 2 weeks of peripheral edema and 3-4 day duration of SOB on exertion and at night time.  Patient went to his PCP 2/28 to establish care and was found with /95.  He was given HCTZ and lisinopril and was instructed to come to the ER due to symptoms.  SOB worse with exertion and laying down.  Prior to this episode, he denies taking any medications at home and had seen a doctor in the past. Eufemia Forte reported family history of MI in his father who passed away in his 45s. Eufemia Forte had no prior cardiac work-up.  He denies fever, chills, abdominal pain, nausea, vomiting, diarrhea, constipation.       In ED, /115. [address] unremarkable.  CMP with T bili 3.8.  proBNP 3480.  Trop-HS 78.5.  EKG shows NSR, nonspecific ST abnormalities; QTc prolongation.  CXR shows cardiomegaly without acute CHF.  He was given Lasix, metoprolol IV 5 mg x2, hydralazine 20 mg and started on Cardene gtt.        General:          Well nourished.  No overt distress   Head:               Normocephalic, atraumatic   Eyes:               Sclerae appear normal.  Pupils equally round. ENT:                Nares appear normal, no drainage.  Moist oral mucosa   Neck:               No restricted ROM.  Trachea midline    CV:                  RRR.  No m/r/g.  No jugular venous distension. Lungs:             CTAB.  No wheezing, rhonchi, or rales.  Respirations even, unlabored   Abdomen:        Bowel sounds present.  Soft, nontender, nondistended. Extremities:     No cyanosis or clubbing.  2+ pitting edema in b/l LE's up to knees   Skin:                No rashes and normal coloration.   Warm and dry.     Neuro:             CN II-XII grossly intact.  Sensation intact.  A&Ox3   Psych:             Normal mood and affect.         Assessment & Plan:       Hypertensive emergency   Initial /115 with evidence of end-organ damage with SOB. Cont home Lisinopril   Add Coreg   Start Cardene gtt   Avoid lowering BP too quickly   Goal to lower MAP by 10-20% in first hour then gradually by further 5-15% over next 23h. Target BP of <180/120 for first hour and <160/110 for next 23       SOB   Peripheral edema   Suspect 2/2 malignant HTN, ? New-onset CHF. pBNP >3K on admission. CXR shows cardiomegaly   Low sodium diet. Fluid restrictions <1.5L/day. Elevate b/l LE's   Lasix IV 40mg IV BID   Monitor renal function   Accurate I&O's. Low salt diet. Fluid restrictions <1.5L    TTE pending   Check venous duplex b/l LE's   Consult Cardiology, appreciate recs       Elevated Troponin   HS-Trop on admission 78.5>>92. EKG on admission. Most likely 2/2 Type 2 demand ischemia due to malignant HTN. Monitor pt on telemetry   HAJA-B therapy   Trend HS-trop   Check TTE   Consult Cardiology, appreciate recs       Hyperbilirubinemia   Tbili 3.8 on admission (no baseline). ALT and AST w/o. Quit alcohol use >20 years ago.    Check hepatitis panel RUQ US   Check fractionated bilirubin             Dispo/Discharge Planning:      >2 midnights.       Patient is critically ill.  Without intervention, there is a high probability of acute organ impairment or life-threatening deterioration in the patient's condition from: Hypertensive emergency   Critical care interventions: Per above   Total critical care time spent: 35 minutes.     Time is indicative of direct patient attendance at bedside and on the patient's floor nearby.  Includes time spent at bedside performing history and exam, performing chart review, discussing findings and treatment plan with patient and/or family, discussing patient with consultants and colleagues, ordering and reviewing pertinent laboratory and radiographic evaluations, and discussing patient with nursing staff.  Time excludes procedures.           Diet: ADULT DIET Regular; Low Fat/Low Chol/High Fiber/BRANDEN; No Salt Added (3-4 gm); 1500 ml   VTE ppx: Lovenox SQ              Hypertension - Clinical Indications for Admission to Inpatient Care by Theresa Clayton       Review Entered Review Status   3/1/2022 10:14 Completed      Criteria Review      Clinical Indications for Admission to Inpatient Care    Most Recent : Theresa Clayton Most Recent Date: 3/1/2022 10:14:44 EST    (X) Admission is indicated for  1 or more  of the following :       (X) Hypertensive emergency indicated by SBP greater than 180 mm Hg or DBP greater than 120 mm       Hg with evidence of acute or worsening target organ damage, as indicated by  1 or more        of the following  (1) (2):          (X) Heart failure (eg, pulmonary edema). See Heart Failure guideline.

## 2022-03-01 NOTE — ACP (ADVANCE CARE PLANNING)
[facility]  At the heart of better care     Advance Care Planning   Admit Date:  2022  6:32 PM   Name:  Iram Rincon   Age:  60 y.o. Sex:  male  :  1965   MRN:  [id number]   Room:  [facility]    Iram Rincon is able to make his own decisions: Yes    If pt unable to make decisions, POA/surrogate decision maker:  Daughter Yasmin Mcwilliams      Patient / surrogate decision-maker directed:  Code Status: FULL CODE -full aggressive medical and surgical interventions, including intubations, resuscitations, pressors, artificial tube feeding    Patient or surrogate consented to discussion of the current conditions, workup, management plans, prognosis, and understand the risk for further deterioration. Time spent: 17 minutes in direct discussion (face to face and/or over phone). Signed:   Curry Hernandez DO

## 2022-03-01 NOTE — PROGRESS NOTES
Pt admitted to ICU OFL with hypertensive emergency. ECHO completed/Cardiology consulted. Chart screened by  for discharge planning. No needs identified at this time. Please consult  if any new issues arise. Care Management Interventions  PCP Verified by CM: Yes Johnnie Basilio)  Last Visit to PCP: 02/28/22  Mode of Transport at Discharge: Other (see comment) (Pt drove from MD office to ER.  Likely to drive home at discharge.)  Discharge Durable Medical Equipment: No  Physical Therapy Consult: No  Occupational Therapy Consult: No  Support Systems: Child(demarco)  Confirm Follow Up Transport: Self  Discharge Location  Patient Expects to be Discharged to[de-identified] Home

## 2022-03-01 NOTE — DISCHARGE SUMMARY
[facility] Cardiology Discharge Summary     Patient ID:  Iram Rincon  [id number]  73 y.o.  1965    Admit date: 2/28/2022    Discharge date:  03/01/2022    Admitting Physician: Curry Hernandez DO     Discharge Physician: Kisha Jules NP/Dr. Hackett    Admission Diagnoses: Hypertensive emergency [I16.1]    Discharge Diagnoses:    Diagnosis    SOB (shortness of breath)    Elevated troponin    Peripheral edema    Elevated brain natriuretic peptide (BNP) level    Volume overload    Hypertensive urgency    Hypertensive emergency       Cardiology Procedures this admission:  EchoCardiogram  Consults: None    Hospital Course: Patient presented to the ER from PCP with 2 weeks of peripheral edema, abd swelling and SOB. He reported SOB more  at night and would wake him up. Several times he would be sleeping and wake up wheezing and feeling like fluid in chest. Did experience some chest soreness after coughing spell one night. At PCP's office his B/P was  185/95.  He was given Rx for HCTZ, lasix, and lisinopril then sent to ED for further care to bring down B/P. In ED, /115. Labs showed  proBNP 3480.  Trop-HS 78. 5.  CXR shows cardiomegaly without acute CHF.  US with 2+ protein. He was given Lasix, metoprolol IV 5 mg x2, hydralazine 20 mg and started on Cardene gtt. Hospitalist admitted to ICU for HTN emergency. He was continued on cardene gtt and IV lasix. Patient was started on PO meds for BP with improvement and cardene drip was weaned off. IVP lasix was converted to PO. He was -2.9 at time of discharge. Patient will have BMP in one week. Echo results:    Left Ventricle: Left ventricle is moderately dilated. Moderately increased wall thickness. Moderate global hypokinesis present. Moderately reduced left ventricular systolic function with a visually estimated EF of 30 - 35%. Abnormal diastolic function.   Aortic Valve: Moderate transvalvular regurgitation.     Mitral Valve: Mild to moderate transvalvular regurgitation.   Left Atrium: Left atrium is moderately dilated.   Aorta: Mildly dilated aortic root. Mildly dilated ascending aorta.   Technical qualifiers: Echo study was technically difficult due to patient's body habitus. At the time of discharge, patient was up feeling well without any complaints of chest pain or shortness of breath. .  For maximized medical therapy for CHF, patient will continue ACE and Beta Blocker  The patient will follow up with [facility] -- Dr. De La Cruz Link on 3/14 @ [phone] in the [place] office. Patient has been referred to cardiac rehab. DISPOSITION: The patient is being discharged home in stable condition on a low saturated fat, low cholesterol and low salt diet. The patient is instructed to advance activities as tolerated to the limit of fatigue or shortness of breath. The patient is instructed to call the office or return to the ER for immediate evaluation for any shortness of breath or chest pain not relieved by NTG. Discharge Exam:   Visit Vitals  BP (!) 160/83   Pulse 69   Temp 98 °F (36.7 °C)   Resp 16   Ht 6' 1\" (1.854 m)   Wt 121.1 kg (267 lb)   SpO2 95%   BMI 35.23 kg/m²     Patient has been seen by Dr. De La Cruz Link: see his progress note for exam details.     Recent Results (from the past 24 hour(s))   TROPONIN-HIGH SENSITIVITY    Collection Time: 02/28/22 11:25 PM   Result Value Ref Range    Troponin-High Sensitivity 92.3 (H) 0 - 14 pg/mL   HEPATITIS PANEL, ACUTE    Collection Time: 03/01/22 12:45 AM   Result Value Ref Range    Hepatitis A, IgM NONREACTIVE NR      Hepatitis B core, IgM NONREACTIVE NR      Hep B Surface Ag NONREACTIVE NR      Hepatitis C virus Ab NONREACTIVE NR     BILIRUBIN, FRACTIONATED    Collection Time: 03/01/22 12:47 AM   Result Value Ref Range    Bilirubin, total 3.8 (H) 0.2 - 1.1 MG/DL    Bilirubin, direct 0.4 (H) <0.4 MG/DL    Bilirubin, indirect 3.4 (H) 0.0 - 1.1 MG/DL   METABOLIC PANEL, BASIC Collection Time: 03/01/22  2:56 AM   Result Value Ref Range    Sodium 138 138 - 145 mmol/L    Potassium 3.4 (L) 3.5 - 5.1 mmol/L    Chloride 105 98 - 107 mmol/L    CO2 28 21 - 32 mmol/L    Anion gap 5 (L) 7 - 16 mmol/L    Glucose 138 (H) 65 - 100 mg/dL    BUN 18 6 - 23 MG/DL    Creatinine 1.00 0.8 - 1.5 MG/DL    GFR est AA >60 >60 ml/min/1.73m2    GFR est non-AA >60 >60 ml/min/1.73m2    Calcium 8.6 8.3 - 10.4 MG/DL   CBC W/O DIFF    Collection Time: 03/01/22  2:56 AM   Result Value Ref Range    WBC 12.7 (H) 4.3 - 11.1 K/uL    RBC 5.42 4.23 - 5.6 M/uL    HGB 16.1 13.6 - 17.2 g/dL    HCT 44.7 41.1 - 50.3 %    MCV 82.5 79.6 - 97.8 FL    MCH 29.7 26.1 - 32.9 PG    MCHC 36.0 (H) 31.4 - 35.0 g/dL    RDW 13.3 11.9 - 14.6 %    PLATELET 960 572 - 564 K/uL    MPV 10.8 9.4 - 12.3 FL    ABSOLUTE NRBC 0.00 0.0 - 0.2 K/uL   TROPONIN-HIGH SENSITIVITY    Collection Time: 03/01/22  2:56 AM   Result Value Ref Range    Troponin-High Sensitivity 62.3 (H) 0 - 14 pg/mL   LIPID PANEL    Collection Time: 03/01/22  2:56 AM   Result Value Ref Range    Cholesterol, total 169 <200 MG/DL    Triglyceride 73 35 - 150 MG/DL    HDL Cholesterol 49 40 - 60 MG/DL    LDL, calculated 105.4 (H) <100 MG/DL    VLDL, calculated 14.6 6.0 - 23.0 MG/DL    CHOL/HDL Ratio 3.4     HEPATIC FUNCTION PANEL    Collection Time: 03/01/22  5:03 AM   Result Value Ref Range    Protein, total 5.3 (L) 6.3 - 8.2 g/dL    Albumin 2.7 (L) 3.5 - 5.0 g/dL    Globulin 2.6 2.3 - 3.5 g/dL    A-G Ratio 1.0 (L) 1.2 - 3.5      Bilirubin, total 3.1 (H) 0.2 - 1.1 MG/DL    Bilirubin, direct 0.3 <0.4 MG/DL    Alk.  phosphatase 75 50 - 136 U/L    AST (SGOT) 34 15 - 37 U/L    ALT (SGPT) 25 12 - 65 U/L   ECHO ADULT COMPLETE    Collection Time: 03/01/22 10:45 AM   Result Value Ref Range    LA Minor Axis 6.9 cm    LA Major Axis 6.4 cm    LA Area 2C 29.6 cm2    LA Area 4C 26.4 cm2    LA Volume  (A) 18 - 58 mL    LA Diameter 4.0 cm    LV EDV A4C 293 mL    LV EDV 3D 310 mL    LV ESV A4C 185 mL    LV ESV 3D 197 mL    EF 3D 37 %    LV Ejection Fraction A4C 37 %    LVOT SV 72.8 ml    LV Mass 3D 417.0 g    IVSd 1.6 (A) 0.6 - 1.0 cm    LVIDd 7.3 (A) 4.2 - 5.9 cm    LVIDs 5.9 cm    LVOT Diameter 2.4 cm    LVOT Mean Gradient 1 mmHg    LVOT VTI 16.1 cm    LVOT Peak Velocity 0.8 m/s    LVOT Peak Gradient 3 mmHg    LVPWd 1.7 (A) 0.6 - 1.0 cm    LV E' Lateral Velocity 5 cm/s    LV E' Septal Velocity 5 cm/s    LVOT Area 4.5 cm2    AR .0 ms    AR Max Velocity PISA 4.9 m/s    AV Peak Velocity 1.5 m/s    AV Peak Gradient 9 mmHg    AV Cusp Mmode 2.7 cm    AV Mean Velocity 0.9 m/s    AV Mean Gradient 4 mmHg    AV VTI 27.8 cm    AV Area by VTI 2.6 cm2    AV Area by Peak Velocity 2.5 cm2    MV E Wave Deceleration Time 201.0 ms    MV A Velocity 0.67 m/s    MV E Velocity 0.99 m/s    MV Mean Gradient 3 mmHg    MV VTI 37.8 cm    MV Mean Velocity 0.8 m/s    MV Max Velocity 1.4 m/s    MV Peak Gradient 8 mmHg    MV Area by VTI 1.9 cm2    RVIDd 2.5 cm    RV Basal Dimension 3.5 cm    TAPSE 2.7 (A) 1.5 - 2.0 cm    TR Max Velocity 2.01 m/s    TR Peak Gradient 16 mmHg    Fractional Shortening 2D 19 28 - 44 %    LV ESV Index 3D 81 mL/m2    LV EDV Index 3D 128 mL/m2    LV ESV Index A4C 76 mL/m2    LV EDV Index A4C 121 mL/m2    LVIDd Index 3.00 cm/m2    LVIDs Index 2.43 cm/m2    LV RWT Ratio 0.47     LV Mass 2D 667.9 (A) 88 - 224 g    LV Mass 2D Index 274.8 (A) 49 - 115 g/m2    LV Mass Index 3D 171.6 g/m2    MV E/A 1.48     E/E' Ratio (Averaged) 19.80     E/E' Lateral 19.80     E/E' Septal 19.80     LA Volume Index BP 42 (A) 16 - 34 ml/m2    LVOT Stroke Volume Index 30.0 mL/m2    LA Size Index 1.65 cm/m2    AV Velocity Ratio 0.53     LVOT:AV VTI Index 0.58     JARON/BSA VTI 1.1 cm2/m2    JARON/BSA Peak Velocity 1.0 cm2/m2    MV:LVOT VTI Index 2.35     Est. RA Pressure 8 mmHg    RVSP 24 mmHg         Patient Instructions:        Current Discharge Medication List      START taking these medications    Details   carvediloL (COREG) 12.5 mg tablet Take 1 Tablet by mouth two (2) times daily (with meals). Qty: 60 Tablet, Refills: 11  Start date: 3/2/2022      potassium chloride (KLOR-CON M10) 10 mEq tablet Take 1 Tablet by mouth daily. Qty: 30 Tablet, Refills: 11  Start date: 3/2/2022      nitroglycerin (NITROSTAT) 0.4 mg SL tablet 1 Tablet by SubLINGual route as needed for Chest Pain. Up to 3 doses. Qty: 25 Tablet, Refills: 5  Start date: 3/1/2022         CONTINUE these medications which have CHANGED    Details   lisinopriL (PRINIVIL, ZESTRIL) 20 mg tablet Take 1 Tablet by mouth daily. Qty: 30 Tablet, Refills: 11  Start date: 3/1/2022    Associated Diagnoses: Hypertensive urgency      furosemide (LASIX) 40 mg tablet Take 1 Tablet by mouth daily. Qty: 30 Tablet, Refills: 11  Start date: 3/1/2022    Associated Diagnoses: Hypertensive urgency; Swelling of lower extremity; Orthopnea         CONTINUE these medications which have NOT CHANGED    Details   aspirin delayed-release 81 mg tablet Take  by mouth daily.          STOP taking these medications       hydroCHLOROthiazide (HYDRODIURIL) 12.5 mg tablet Comments:   Reason for Stopping:                  Signed:  Dwayne Bach NP  3/1/2022  5:18 PM

## 2022-03-01 NOTE — ED NOTES
TRANSFER - OUT REPORT:    Verbal report given to Amita Anderson RN on Rick Cardenas  being transferred to 3rd floor for routine progression of care       Report consisted of patients Situation, Background, Assessment and   Recommendations(SBAR). Information from the following report(s) SBAR was reviewed with the receiving nurse. Lines:   Peripheral IV 02/28/22 Right Antecubital (Active)   Site Assessment Clean, dry, & intact 02/28/22 1613   Phlebitis Assessment 0 02/28/22 1613   Infiltration Assessment 0 02/28/22 1613   Dressing Status Clean, dry, & intact 02/28/22 1613        Opportunity for questions and clarification was provided.       Patient transported with:   [employer]

## 2022-03-07 ENCOUNTER — HOSPITAL ENCOUNTER (OUTPATIENT)
Dept: LAB | Age: 57
Discharge: HOME | End: 2022-03-07
Payer: COMMERCIAL

## 2022-03-07 LAB
ALBUMIN SERPL-MCNC: 3.3 G/DL (ref 3.5–5)
ALBUMIN/GLOB SERPL: 1.1 {RATIO} (ref 1.2–3.5)
ALP SERPL-CCNC: 88 U/L (ref 50–136)
ALT SERPL-CCNC: 25 U/L (ref 12–65)
ANION GAP SERPL CALC-SCNC: 5 MMOL/L (ref 7–16)
AST SERPL-CCNC: 23 U/L (ref 15–37)
BILIRUB SERPL-MCNC: 1.9 MG/DL (ref 0.2–1.1)
BUN SERPL-MCNC: 23 MG/DL (ref 6–23)
CALCIUM SERPL-MCNC: 9 MG/DL (ref 8.3–10.4)
CHLORIDE SERPL-SCNC: 106 MMOL/L (ref 98–107)
CO2 SERPL-SCNC: 30 MMOL/L (ref 21–32)
CREAT SERPL-MCNC: 1 MG/DL (ref 0.8–1.5)
GLOBULIN SER CALC-MCNC: 3 G/DL (ref 2.3–3.5)
GLUCOSE SERPL-MCNC: 87 MG/DL (ref 65–100)
POTASSIUM SERPL-SCNC: 4.2 MMOL/L (ref 3.5–5.1)
PROT SERPL-MCNC: 6.3 G/DL (ref 6.3–8.2)
SODIUM SERPL-SCNC: 141 MMOL/L (ref 136–145)

## 2022-03-07 PROCEDURE — 80053 COMPREHEN METABOLIC PANEL: CPT

## 2022-03-07 PROCEDURE — 36415 COLL VENOUS BLD VENIPUNCTURE: CPT

## 2022-03-14 PROBLEM — I35.1 NONRHEUMATIC AORTIC VALVE INSUFFICIENCY: Status: ACTIVE | Noted: 2022-03-14

## 2022-03-14 PROBLEM — R06.02 SOB (SHORTNESS OF BREATH): Status: RESOLVED | Noted: 2022-03-01 | Resolved: 2022-03-14

## 2022-03-14 PROBLEM — E87.70 VOLUME OVERLOAD: Status: RESOLVED | Noted: 2022-03-01 | Resolved: 2022-03-14

## 2022-03-14 PROBLEM — I50.22 CHRONIC SYSTOLIC CONGESTIVE HEART FAILURE (HCC): Status: ACTIVE | Noted: 2022-03-14

## 2022-03-15 PROBLEM — I16.1 HYPERTENSIVE EMERGENCY: Status: RESOLVED | Noted: 2022-02-28 | Resolved: 2022-03-15

## 2022-03-15 PROBLEM — I10 ESSENTIAL HYPERTENSION: Status: ACTIVE | Noted: 2022-02-28

## 2022-03-15 PROBLEM — R31.21 ASYMPTOMATIC MICROSCOPIC HEMATURIA: Status: ACTIVE | Noted: 2022-03-15

## 2022-03-15 PROBLEM — R17 ELEVATED BILIRUBIN: Status: ACTIVE | Noted: 2022-03-15

## 2022-03-15 PROBLEM — I50.43 ACUTE ON CHRONIC COMBINED SYSTOLIC AND DIASTOLIC CONGESTIVE HEART FAILURE (HCC): Status: ACTIVE | Noted: 2022-03-15

## 2022-03-15 PROBLEM — M67.479 GANGLION CYST OF FOOT: Status: ACTIVE | Noted: 2022-03-15

## 2022-03-15 PROBLEM — E78.49 OTHER HYPERLIPIDEMIA: Status: ACTIVE | Noted: 2022-03-15

## 2022-03-18 PROBLEM — R60.0 PERIPHERAL EDEMA: Status: ACTIVE | Noted: 2022-03-01

## 2022-03-18 PROBLEM — I16.1 HYPERTENSIVE EMERGENCY: Status: RESOLVED | Noted: 2022-02-28 | Resolved: 2022-03-15

## 2022-03-18 PROBLEM — R60.9 PERIPHERAL EDEMA: Status: ACTIVE | Noted: 2022-03-01

## 2022-03-18 NOTE — PROGRESS NOTES
Called to pre-assess for C , Scheduled 3/21/22 arrival time 0900. No answer & message left. Pt helped onto bed pan.

## 2022-03-19 PROBLEM — R77.8 ELEVATED TROPONIN: Status: ACTIVE | Noted: 2022-03-01

## 2022-03-19 PROBLEM — R79.89 ELEVATED BRAIN NATRIURETIC PEPTIDE (BNP) LEVEL: Status: ACTIVE | Noted: 2022-03-01

## 2022-03-19 PROBLEM — L91.8 SKIN TAG: Status: ACTIVE | Noted: 2022-02-28

## 2022-03-19 PROBLEM — I35.1 NONRHEUMATIC AORTIC VALVE INSUFFICIENCY: Status: ACTIVE | Noted: 2022-03-14

## 2022-03-19 PROBLEM — E80.6 HYPERBILIRUBINEMIA: Status: ACTIVE | Noted: 2022-03-01

## 2022-03-19 PROBLEM — R79.89 ELEVATED TROPONIN: Status: ACTIVE | Noted: 2022-03-01

## 2022-03-19 PROBLEM — I10 ESSENTIAL HYPERTENSION: Status: ACTIVE | Noted: 2022-02-28

## 2022-03-19 PROBLEM — I50.22 CHRONIC SYSTOLIC CONGESTIVE HEART FAILURE (HCC): Status: ACTIVE | Noted: 2022-03-14

## 2022-03-21 ENCOUNTER — HOSPITAL ENCOUNTER (OUTPATIENT)
Age: 57
Setting detail: OUTPATIENT SURGERY
Discharge: HOME | End: 2022-03-21
Attending: INTERNAL MEDICINE | Admitting: INTERNAL MEDICINE
Payer: COMMERCIAL

## 2022-03-21 VITALS
WEIGHT: 263 LBS | RESPIRATION RATE: 20 BRPM | BODY MASS INDEX: 34.85 KG/M2 | HEART RATE: 61 BPM | DIASTOLIC BLOOD PRESSURE: 80 MMHG | OXYGEN SATURATION: 99 % | SYSTOLIC BLOOD PRESSURE: 165 MMHG | TEMPERATURE: 98.1 F | HEIGHT: 73 IN

## 2022-03-21 DIAGNOSIS — I16.0 HYPERTENSIVE URGENCY: ICD-10-CM

## 2022-03-21 DIAGNOSIS — I50.22 CHRONIC SYSTOLIC CONGESTIVE HEART FAILURE (HCC): ICD-10-CM

## 2022-03-21 DIAGNOSIS — I25.10 CORONARY ARTERY DISEASE INVOLVING NATIVE CORONARY ARTERY OF NATIVE HEART WITHOUT ANGINA PECTORIS: Primary | ICD-10-CM

## 2022-03-21 LAB
ACT BLD: 297 SECS (ref 70–128)
ANION GAP SERPL CALC-SCNC: 5 MMOL/L (ref 7–16)
ATRIAL RATE: 59 BPM
ATRIAL RATE: 61 BPM
BUN SERPL-MCNC: 20 MG/DL (ref 6–23)
CALCIUM SERPL-MCNC: 8.4 MG/DL (ref 8.3–10.4)
CALCULATED P AXIS, ECG09: 54 DEGREES
CALCULATED P AXIS, ECG09: 54 DEGREES
CALCULATED R AXIS, ECG10: -20 DEGREES
CALCULATED R AXIS, ECG10: -24 DEGREES
CALCULATED T AXIS, ECG11: 134 DEGREES
CALCULATED T AXIS, ECG11: 147 DEGREES
CHLORIDE SERPL-SCNC: 107 MMOL/L (ref 98–107)
CO2 SERPL-SCNC: 28 MMOL/L (ref 21–32)
CREAT SERPL-MCNC: 1.2 MG/DL (ref 0.8–1.5)
DIAGNOSIS, 93000: NORMAL
DIAGNOSIS, 93000: NORMAL
ERYTHROCYTE [DISTWIDTH] IN BLOOD BY AUTOMATED COUNT: 12.8 % (ref 11.9–14.6)
GLUCOSE SERPL-MCNC: 124 MG/DL (ref 65–100)
HCT VFR BLD AUTO: 44.5 % (ref 41.1–50.3)
HGB BLD-MCNC: 15.5 G/DL (ref 13.6–17.2)
MCH RBC QN AUTO: 29.6 PG (ref 26.1–32.9)
MCHC RBC AUTO-ENTMCNC: 34.8 G/DL (ref 31.4–35)
MCV RBC AUTO: 85.1 FL (ref 79.6–97.8)
NRBC # BLD: 0 K/UL (ref 0–0.2)
P-R INTERVAL, ECG05: 168 MS
P-R INTERVAL, ECG05: 178 MS
PLATELET # BLD AUTO: 201 K/UL (ref 150–450)
PMV BLD AUTO: 10.1 FL (ref 9.4–12.3)
POTASSIUM SERPL-SCNC: 4.7 MMOL/L (ref 3.5–5.1)
Q-T INTERVAL, ECG07: 444 MS
Q-T INTERVAL, ECG07: 450 MS
QRS DURATION, ECG06: 112 MS
QRS DURATION, ECG06: 112 MS
QTC CALCULATION (BEZET), ECG08: 445 MS
QTC CALCULATION (BEZET), ECG08: 446 MS
RBC # BLD AUTO: 5.23 M/UL (ref 4.23–5.6)
SODIUM SERPL-SCNC: 140 MMOL/L (ref 138–145)
VENTRICULAR RATE, ECG03: 59 BPM
VENTRICULAR RATE, ECG03: 61 BPM
WBC # BLD AUTO: 8.2 K/UL (ref 4.3–11.1)

## 2022-03-21 PROCEDURE — 85347 COAGULATION TIME ACTIVATED: CPT

## 2022-03-21 PROCEDURE — 92928 PRQ TCAT PLMT NTRAC ST 1 LES: CPT | Performed by: INTERNAL MEDICINE

## 2022-03-21 PROCEDURE — C1769 GUIDE WIRE: HCPCS | Performed by: INTERNAL MEDICINE

## 2022-03-21 PROCEDURE — 93005 ELECTROCARDIOGRAM TRACING: CPT | Performed by: INTERNAL MEDICINE

## 2022-03-21 PROCEDURE — 77030042317 HC BND COMPR HEMSTAT -B: Performed by: INTERNAL MEDICINE

## 2022-03-21 PROCEDURE — 93458 L HRT ARTERY/VENTRICLE ANGIO: CPT | Performed by: INTERNAL MEDICINE

## 2022-03-21 PROCEDURE — C1725 CATH, TRANSLUMIN NON-LASER: HCPCS | Performed by: INTERNAL MEDICINE

## 2022-03-21 PROCEDURE — 99153 MOD SED SAME PHYS/QHP EA: CPT | Performed by: INTERNAL MEDICINE

## 2022-03-21 PROCEDURE — 77030015766: Performed by: INTERNAL MEDICINE

## 2022-03-21 PROCEDURE — 85027 COMPLETE CBC AUTOMATED: CPT

## 2022-03-21 PROCEDURE — 99152 MOD SED SAME PHYS/QHP 5/>YRS: CPT | Performed by: INTERNAL MEDICINE

## 2022-03-21 PROCEDURE — 80048 BASIC METABOLIC PNL TOTAL CA: CPT

## 2022-03-21 PROCEDURE — 77030016699 HC CATH ANGI DX INFN1 CARD -A: Performed by: INTERNAL MEDICINE

## 2022-03-21 PROCEDURE — C1894 INTRO/SHEATH, NON-LASER: HCPCS | Performed by: INTERNAL MEDICINE

## 2022-03-21 PROCEDURE — 74011250637 HC RX REV CODE- 250/637: Performed by: INTERNAL MEDICINE

## 2022-03-21 PROCEDURE — 74011250636 HC RX REV CODE- 250/636: Performed by: INTERNAL MEDICINE

## 2022-03-21 PROCEDURE — 74011000636 HC RX REV CODE- 636: Performed by: INTERNAL MEDICINE

## 2022-03-21 PROCEDURE — C1887 CATHETER, GUIDING: HCPCS | Performed by: INTERNAL MEDICINE

## 2022-03-21 PROCEDURE — 74011000250 HC RX REV CODE- 250: Performed by: INTERNAL MEDICINE

## 2022-03-21 PROCEDURE — C1874 STENT, COATED/COV W/DEL SYS: HCPCS | Performed by: INTERNAL MEDICINE

## 2022-03-21 DEVICE — STENT RONYX25015UX RESOLUTE ONYX 2.50X15
Type: IMPLANTABLE DEVICE | Status: FUNCTIONAL
Brand: RESOLUTE ONYX™

## 2022-03-21 RX ORDER — CLOPIDOGREL BISULFATE 75 MG/1
TABLET ORAL AS NEEDED
Status: DISCONTINUED | OUTPATIENT
Start: 2022-03-21 | End: 2022-03-21 | Stop reason: HOSPADM

## 2022-03-21 RX ORDER — LIDOCAINE HYDROCHLORIDE 10 MG/ML
INJECTION INFILTRATION; PERINEURAL AS NEEDED
Status: DISCONTINUED | OUTPATIENT
Start: 2022-03-21 | End: 2022-03-21 | Stop reason: HOSPADM

## 2022-03-21 RX ORDER — FENTANYL CITRATE 50 UG/ML
INJECTION, SOLUTION INTRAMUSCULAR; INTRAVENOUS AS NEEDED
Status: DISCONTINUED | OUTPATIENT
Start: 2022-03-21 | End: 2022-03-21 | Stop reason: HOSPADM

## 2022-03-21 RX ORDER — HEPARIN SODIUM 200 [USP'U]/100ML
INJECTION, SOLUTION INTRAVENOUS
Status: COMPLETED | OUTPATIENT
Start: 2022-03-21 | End: 2022-03-21

## 2022-03-21 RX ORDER — GUAIFENESIN 100 MG/5ML
81 LIQUID (ML) ORAL ONCE
Status: DISCONTINUED | OUTPATIENT
Start: 2022-03-21 | End: 2022-03-21 | Stop reason: HOSPADM

## 2022-03-21 RX ORDER — HEPARIN SODIUM 10000 [USP'U]/ML
INJECTION, SOLUTION INTRAVENOUS; SUBCUTANEOUS AS NEEDED
Status: DISCONTINUED | OUTPATIENT
Start: 2022-03-21 | End: 2022-03-21 | Stop reason: HOSPADM

## 2022-03-21 RX ORDER — MIDAZOLAM HYDROCHLORIDE 1 MG/ML
INJECTION, SOLUTION INTRAMUSCULAR; INTRAVENOUS AS NEEDED
Status: DISCONTINUED | OUTPATIENT
Start: 2022-03-21 | End: 2022-03-21 | Stop reason: HOSPADM

## 2022-03-21 RX ORDER — SODIUM CHLORIDE 9 MG/ML
75 INJECTION, SOLUTION INTRAVENOUS CONTINUOUS
Status: DISCONTINUED | OUTPATIENT
Start: 2022-03-21 | End: 2022-03-21 | Stop reason: HOSPADM

## 2022-03-21 RX ORDER — CLOPIDOGREL BISULFATE 75 MG/1
75 TABLET ORAL DAILY
Qty: 90 TABLET | Refills: 3 | Status: SHIPPED | OUTPATIENT
Start: 2022-03-21

## 2022-03-21 RX ORDER — MAG HYDROX/ALUMINUM HYD/SIMETH 200-200-20
SUSPENSION, ORAL (FINAL DOSE FORM) ORAL AS NEEDED
Status: DISCONTINUED | OUTPATIENT
Start: 2022-03-21 | End: 2022-03-21 | Stop reason: HOSPADM

## 2022-03-21 RX ADMIN — SODIUM CHLORIDE 75 ML/HR: 900 INJECTION, SOLUTION INTRAVENOUS at 09:53

## 2022-03-21 NOTE — DISCHARGE SUMMARY
[facility] Cardiology Discharge Summary     Patient ID:  Demetris Avelar  [id number]  10 y.o.  1965    Admit date: 3/21/2022    Discharge date:  03/21/2022    Admitting Physician: Freda Perkins MD     Discharge Physician: Charanjit Shine NP/Dr. Hackett     Admission Diagnoses: Chronic systolic congestive heart failure ([facility] [address] [I50.22]  Hypertensive urgency [I16.0]    Discharge Diagnoses:   Patient Active Problem List    Diagnosis Date Noted    Coronary artery disease involving native coronary artery of native heart without angina pectoris 03/21/2022    Other hyperlipidemia 03/15/2022    Asymptomatic microscopic hematuria 03/15/2022    Chronic systolic congestive heart failure ([facility] [address] 03/14/2022    Nonrheumatic aortic valve insufficiency 03/14/2022    Essential hypertension 02/28/2022       Cardiology Procedures this admission:  Left heart catheterization with PCI  Consults: None    Hospital Course: Patient was seen at the office of [facility] Cardiology by Dr. Basim Sawyer for follow up after hospitalization for heart failure. Patient and was subsequently scheduled for [place] [facility] [place] [facility] [place]. Patient underwent cardiac catheterization by Dr. Basim Sawyer. Patient was found to have significant stenosis of the mPDA that was stented with an [place] RAE 2.5x15 with 0% residual stenosis. Patient tolerated the procedure well and was taken to CPRU for recovery. At the time of discharge, patient was up feeling well without any complaints of chest pain or shortness of breath. Patient's right radial cath site was clean, dry and intact without hematoma or bruit. Patient's labs were WNL. Patient was seen and examined by Dr. Basim Sawyer and determined stable and ready for discharge. Patient was instructed on the importance of medication compliance including taking aspirin and Plavix everyday without missing a dose.   After receiving drug eluting stents, the patient will remain on dual anti-platelet therapy for 1 year. For maximized medical therapy for CAD, patient will continue BB, ACE, and statin. The patient will follow up with [facility] Cardiology -- Dr. Dereck Choudhury in 2-4 weeks. Patient has been referred to cardiac rehab. DISPOSITION: The patient is being discharged home in stable condition on a low saturated fat, low cholesterol and low salt diet. The patient is instructed to advance activities as tolerated to the limit of fatigue or shortness of breath. The patient is instructed to avoid all heavy lifting, straining, stooping or squatting for 3-5 days. The patient is instructed to watch the cath site for bleeding/oozing; if seen, the patient is instructed to apply firm pressure with a clean cloth and call [facility] Cardiology at [phone]. The patient is instructed to watch for signs of infection which include: increasing area of redness, fever/hot to touch or purulent drainage at the catheterization site. The patient is instructed not to soak in a bathtub for 7-10 days, but is cleared to shower. The patient is instructed to call the office or return to the ER for immediate evaluation for any shortness of breath or chest pain not relieved by NTG.         Discharge Exam:   Visit Vitals  BP (!) (P) 165/80   Pulse (P) 61   Temp 98.1 °F (36.7 °C)   Resp 20   Ht 6' 1\" (1.854 m)   Wt 119.3 kg (263 lb)   SpO2 (P) 99%   BMI 34.70 kg/m²       Recent Results (from the past 24 hour(s))   CBC W/O DIFF    Collection Time: 03/21/22  9:34 AM   Result Value Ref Range    WBC 8.2 4.3 - 11.1 K/uL    RBC 5.23 4.23 - 5.6 M/uL    HGB 15.5 13.6 - 17.2 g/dL    HCT 44.5 41.1 - 50.3 %    MCV 85.1 79.6 - 97.8 FL    MCH 29.6 26.1 - 32.9 PG    MCHC 34.8 31.4 - 35.0 g/dL    RDW 12.8 11.9 - 14.6 %    PLATELET 070 294 - 263 K/uL    MPV 10.1 9.4 - 12.3 FL    ABSOLUTE NRBC 0.00 0.0 - 0.2 K/uL   METABOLIC PANEL, BASIC    Collection Time: 03/21/22  9:34 AM   Result Value Ref Range    Sodium 140 138 - 145 mmol/L    Potassium 4.7 3.5 - 5.1 mmol/L    Chloride 107 98 - 107 mmol/L    CO2 28 21 - 32 mmol/L    Anion gap 5 (L) 7 - 16 mmol/L    Glucose 124 (H) 65 - 100 mg/dL    BUN 20 6 - 23 MG/DL    Creatinine 1.20 0.8 - 1.5 MG/DL    GFR est AA >60 >60 ml/min/1.73m2    GFR est non-AA >60 >60 ml/min/1.73m2    Calcium 8.4 8.3 - 10.4 MG/DL   EKG, 12 LEAD, INITIAL    Collection Time: 03/21/22 10:03 AM   Result Value Ref Range    Ventricular Rate 61 BPM    Atrial Rate 61 BPM    P-R Interval 168 ms    QRS Duration 112 ms    Q-T Interval 444 ms    QTC Calculation (Bezet) 446 ms    Calculated P Axis 54 degrees    Calculated R Axis -20 degrees    Calculated T Axis 147 degrees    Diagnosis       Normal sinus rhythm  Left ventricular hypertrophy with repolarization abnormality  Abnormal ECG  When compared with ECG of 28-FEB-2022 16:12,  Vent.  rate has decreased BY  38 BPM  T wave inversion more evident in Lateral leads  Confirmed by Tan Blood MD (), Jose Alberto Ramos ([id number]) on 3/21/2022 2:25:59 PM     POC ACTIVATED CLOTTING TIME    Collection Time: 03/21/22  1:01 PM   Result Value Ref Range    Activated Clotting Time (POC) 297 (H) 70 - 128 SECS   EKG, 12 LEAD, SUBSEQUENT    Collection Time: 03/21/22  4:17 PM   Result Value Ref Range    Ventricular Rate 59 BPM    Atrial Rate 59 BPM    P-R Interval 178 ms    QRS Duration 112 ms    Q-T Interval 450 ms    QTC Calculation (Bezet) 445 ms    Calculated P Axis 54 degrees    Calculated R Axis -24 degrees    Calculated T Axis 134 degrees    Diagnosis       Sinus bradycardia  Possible Left atrial enlargement  Left ventricular hypertrophy with repolarization abnormality  Abnormal ECG  When compared with ECG of 21-MAR-2022 10:03,  T wave inversion less evident in Lateral leads  Confirmed by Tan Blood MD (), Jose Alberto Ramos ([id number]) on 3/21/2022 4:46:51 PM           Patient Instructions:        Current Discharge Medication List      START taking these medications    Details   clopidogreL (Plavix) 75 mg tab Take 1 Tablet by mouth daily.  Qty: 90 Tablet, Refills: 3  Start date: 3/21/2022         CONTINUE these medications which have NOT CHANGED    Details   carvediloL (COREG) 25 mg tablet Take 1 Tablet by mouth two (2) times daily (with meals). Qty: 180 Tablet, Refills: 3    Associated Diagnoses: Chronic systolic congestive heart failure ([facility] [address] Hypertensive urgency      lisinopriL (PRINIVIL, ZESTRIL) 20 mg tablet Take 1 Tablet by mouth daily. Qty: 30 Tablet, Refills: 11    Associated Diagnoses: Hypertensive urgency      furosemide (LASIX) 40 mg tablet Take 1 Tablet by mouth daily. Qty: 30 Tablet, Refills: 11    Associated Diagnoses: Hypertensive urgency; Swelling of lower extremity; Orthopnea      potassium chloride (KLOR-CON M10) 10 mEq tablet Take 1 Tablet by mouth daily. Qty: 30 Tablet, Refills: 11      aspirin delayed-release 81 mg tablet Take  by mouth daily. atorvastatin (LIPITOR) 40 mg tablet Take 1 Tablet by mouth daily. Qty: 30 Tablet, Refills: 0    Associated Diagnoses: Other hyperlipidemia      nitroglycerin (NITROSTAT) 0.4 mg SL tablet 1 Tablet by SubLINGual route as needed for Chest Pain. Up to 3 doses.   Qty: 25 Tablet, Refills: 5               Signed:  Mayank Horan NP  3/21/2022  2:54 PM

## 2022-03-21 NOTE — PROCEDURES
Brief Cardiac Procedure Note    Patient: Maribell Lopez MRN: [id number]  SSN: [id number]    YOB: 1965  Age: 60 y.o. Sex: male      Date of Procedure: 3/21/2022     Pre-procedure Diagnosis: Typical Angina    Post-procedure Diagnosis: Coronary Artery Disease    Procedure: Left Heart Catheterization with Percutaneous Coronary Intervention    Brief Description of Procedure: As above    Performed By: Alfredo Cheung MD     Assistants: None    Anesthesia: Moderate Sedation    Estimated Blood Loss: Less than 10 mL      Specimens: None    Implants: None    Findings: [place] 2.5 x 15 to mPDA    Complications: None    Recommendations: Continue medical therapy.     Signed By: Alfredo Cheung MD     March 21, 2022

## 2022-03-21 NOTE — H&P
[facility] Cardiology Discharge Summary     Patient ID:  Vicky Velasquez  [id number]  26 y.o.  1965    Admit date: 3/21/2022    Discharge date:  03/21/2022    Admitting Physician: Charmaine Roper MD     Discharge Physician: Guanako Garner NP/Dr. Hackett     Admission Diagnoses: Chronic systolic congestive heart failure ([facility] [address] [I50.22]  Hypertensive urgency [I16.0]    Discharge Diagnoses:   Patient Active Problem List    Diagnosis Date Noted    Coronary artery disease involving native coronary artery of native heart without angina pectoris 03/21/2022    Other hyperlipidemia 03/15/2022    Asymptomatic microscopic hematuria 03/15/2022    Chronic systolic congestive heart failure ([facility] [address] 03/14/2022    Nonrheumatic aortic valve insufficiency 03/14/2022    Essential hypertension 02/28/2022       Cardiology Procedures this admission:  Left heart catheterization with PCI  Consults: None    Hospital Course: Patient was seen at the office of [facility] by Dr. Chandana Green for follow up after hospitalization for heart failure. Patient and was subsequently scheduled for an AM Admission [facility] at [facility] on 03/21/22. Patient underwent cardiac catheterization by Dr. Chandana Green. Patient was found to have significant stenosis of the mPDA that was stented with an [place] RAE 2.5x15 with 0% residual stenosis. Patient tolerated the procedure well and was taken to CPRU for recovery. At the time of discharge, patient was up feeling well without any complaints of chest pain or shortness of breath. Patient's right radial cath site was clean, dry and intact without hematoma or bruit. Patient's labs were WNL. Patient was seen and examined by Dr. Chandana Green and determined stable and ready for discharge. Patient was instructed on the importance of medication compliance including taking aspirin and Plavix everyday without missing a dose.   After receiving drug eluting stents, the patient will remain on dual anti-platelet therapy for 1 year. For maximized medical therapy for CAD, patient will continue BB, ACE, and statin. The patient will follow up with [facility] Cardiology -- Dr. Dana Richards in 2-4 weeks. Patient has been referred to cardiac rehab. DISPOSITION: The patient is being discharged home in stable condition on a low saturated fat, low cholesterol and low salt diet. The patient is instructed to advance activities as tolerated to the limit of fatigue or shortness of breath. The patient is instructed to avoid all heavy lifting, straining, stooping or squatting for 3-5 days. The patient is instructed to watch the cath site for bleeding/oozing; if seen, the patient is instructed to apply firm pressure with a clean cloth and call [facility] Cardiology at [phone]. The patient is instructed to watch for signs of infection which include: increasing area of redness, fever/hot to touch or purulent drainage at the catheterization site. The patient is instructed not to soak in a bathtub for 7-10 days, but is cleared to shower. The patient is instructed to call the office or return to the ER for immediate evaluation for any shortness of breath or chest pain not relieved by NTG.         Discharge Exam:   Visit Vitals  BP (!) 162/72   Pulse 61   Temp 98.1 °F (36.7 °C)   Resp 20   Ht 6' 1\" (1.854 m)   Wt 119.3 kg (263 lb)   SpO2 95%   BMI 34.70 kg/m²       Recent Results (from the past 24 hour(s))   CBC W/O DIFF    Collection Time: 03/21/22  9:34 AM   Result Value Ref Range    WBC 8.2 4.3 - 11.1 K/uL    RBC 5.23 4.23 - 5.6 M/uL    HGB 15.5 13.6 - 17.2 g/dL    HCT 44.5 41.1 - 50.3 %    MCV 85.1 79.6 - 97.8 FL    MCH 29.6 26.1 - 32.9 PG    MCHC 34.8 31.4 - 35.0 g/dL    RDW 12.8 11.9 - 14.6 %    PLATELET 879 944 - 779 K/uL    MPV 10.1 9.4 - 12.3 FL    ABSOLUTE NRBC 0.00 0.0 - 0.2 K/uL   METABOLIC PANEL, BASIC    Collection Time: 03/21/22  9:34 AM   Result Value Ref Range    Sodium 140 138 - 145 mmol/L    Potassium 4.7 3.5 - 5.1 mmol/L    Chloride 107 98 - 107 mmol/L    CO2 28 21 - 32 mmol/L    Anion gap 5 (L) 7 - 16 mmol/L    Glucose 124 (H) 65 - 100 mg/dL    BUN 20 6 - 23 MG/DL    Creatinine 1.20 0.8 - 1.5 MG/DL    GFR est AA >60 >60 ml/min/1.73m2    GFR est non-AA >60 >60 ml/min/1.73m2    Calcium 8.4 8.3 - 10.4 MG/DL   EKG, 12 LEAD, INITIAL    Collection Time: 03/21/22 10:03 AM   Result Value Ref Range    Ventricular Rate 61 BPM    Atrial Rate 61 BPM    P-R Interval 168 ms    QRS Duration 112 ms    Q-T Interval 444 ms    QTC Calculation (Bezet) 446 ms    Calculated P Axis 54 degrees    Calculated R Axis -20 degrees    Calculated T Axis 147 degrees    Diagnosis       Normal sinus rhythm  Left ventricular hypertrophy with repolarization abnormality  Abnormal ECG  When compared with ECG of 28-FEB-2022 16:12,  Vent. rate has decreased BY  38 BPM  T wave inversion more evident in Lateral leads  Confirmed by Florence Porter MD (), Surinder Irizarry ([id number]) on 3/21/2022 2:25:59 PM     POC ACTIVATED CLOTTING TIME    Collection Time: 03/21/22  1:01 PM   Result Value Ref Range    Activated Clotting Time (POC) 297 (H) 70 - 128 SECS         Patient Instructions:        Current Discharge Medication List      START taking these medications    Details   clopidogreL (Plavix) 75 mg tab Take 1 Tablet by mouth daily. Qty: 90 Tablet, Refills: 3  Start date: 3/21/2022         CONTINUE these medications which have NOT CHANGED    Details   carvediloL (COREG) 25 mg tablet Take 1 Tablet by mouth two (2) times daily (with meals). Qty: 180 Tablet, Refills: 3    Associated Diagnoses: Chronic systolic congestive heart failure ([address] Hypertensive urgency      lisinopriL (PRINIVIL, ZESTRIL) 20 mg tablet Take 1 Tablet by mouth daily. Qty: 30 Tablet, Refills: 11    Associated Diagnoses: Hypertensive urgency      furosemide (LASIX) 40 mg tablet Take 1 Tablet by mouth daily.   Qty: 30 Tablet, Refills: 11    Associated Diagnoses: Hypertensive urgency; Swelling of lower extremity; Orthopnea      potassium chloride (KLOR-CON M10) 10 mEq tablet Take 1 Tablet by mouth daily. Qty: 30 Tablet, Refills: 11      aspirin delayed-release 81 mg tablet Take  by mouth daily. atorvastatin (LIPITOR) 40 mg tablet Take 1 Tablet by mouth daily. Qty: 30 Tablet, Refills: 0    Associated Diagnoses: Other hyperlipidemia      nitroglycerin (NITROSTAT) 0.4 mg SL tablet 1 Tablet by SubLINGual route as needed for Chest Pain. Up to 3 doses.   Qty: 25 Tablet, Refills: 5               Signed:  Qian Stewart NP  3/21/2022  2:54 PM

## 2022-03-21 NOTE — PROGRESS NOTES
Discharge Same Day as PCI Teaching    Discharge teaching completed. Patient instructed on right radial site care, including symptoms to report to MD, medication changes & Follow up Care to include:    1- Patient is being treated with 2 separate anti-platelet medications including aspirin 81mg  & plavix 75mg. It is very important that these medications are filled today started tomorrow 3/22/22. Patient instructed on the importance of these medications & that these medications must not be stopped for any reason or without talking to the doctor first.  2-  Patient is also being discharged on a medication for cholesterol management (ie-statin) Atorvastatin 40mg daily and instructed on the importance of continuing this medication as well. 3- Patient received a referral for Cardiac Rehab II, contact information was faxed to Cardiac rehab & patient given telephone number to contact ([phone]) Cardiac Rehab if they have not heard from them within 10 days.

## 2022-03-21 NOTE — DISCHARGE INSTRUCTIONS
POST PCI/STENT DISCHARGE INSTRUCTIONS    1. Check puncture site frequently for swelling or bleeding. If there is any bleeding, lie down and apply pressure over the area with a clean towel or washcloth and call 911. Notify your doctor for any redness, swelling, drainage, or oozing from the puncture site. Notify your doctor for any fever or chills. 2. If the extremity becomes cold, numb, or painful call [facility] at [phone].    3. Activity should be limited for the next 48-72 hours. No heavy lifting (anything over 10 pounds) for 3 days. No pushing or pulling with right wrist for the next three days. 4.  You may resume your usual diet. Drink more fluids than usual.    5.  Have a responsible person drive you home and stay with you for at least 24 hours after your heart catheterization/angiography. No driving for 24 hours. 6. You may remove bandage from your right wrist in 24 hours. You may shower in 24 hours. No tub baths, hot tubs, or swimming for 1 week. Do not place any lotions, creams, powders, or ointments over puncture site for 1 week. You may place a clean band-aid over the puncture site each day for 5 days. Change daily. YOU ARE BEING DISCHARGED ON TWO ANTI-PLATELET MEDICATIONS    1- Aspirin 81mg by mouth daily    2- Plavix 75mg by mouth daily    THESE MEDICATIONS  ARE VERY IMPORTANT TO YOUR RECOVERY AND  MUST BE TAKEN EXACTLY AS PRESCRIBED & NOT STOPPED FOR ANY REASON. THESE MAY ONLY BE STOPPED WITH AN ORDER FROM YOUR CARDIOLOGIST. PLEASE HAVE THESE FILLED IMMEDIATELY AND START TAKING tomorrow 3/22/22    YOU ARE ALSO BEING DISCHARGED ON A MEDICATION FOR CHOLESTEROL MANAGEMENT. 1- Atorvastatin 40mg by mouth daily      You have also been referred to [facility]. Someone from Cardiac Rehab will be calling you to set up a follow up appointment. If they have not called you within a week,  please call [phone].       Sedation for a Medical Procedure: Care [facility] is only accepting physician referrals at this time. If the patient would like to be seen in our clinic, they will need to contact their physician to obtain a referral and send it to the clinic. Left a message of this information.   Amy Oglesby CMA  5/11/2018 2:09 PM          Instructions     You were given a sedative medication during your visit. While many of the effects will have worn   off before you leave; you may continue to feel some effects for several hours. Common side effects from sedation include:  · Feeling sleepy. (Your doctors and nurses will make sure you are not too sleepy to go home.)  · Nausea and vomiting. This usually does not last long. · Feeling tired. How can you care for yourself at home? Activity    · Don't do anything for 24 hours that requires attention to detail. It takes time for the medicine effects to completely wear off. · Do not make important legal decisions for 24 hours. · Do not sign any legal documents for 24 hours. · Do not drink alcohol today     · For your safety, you should not drive or operate heavy machinery for the remainder of the day     · Rest when you feel tired. Getting enough sleep will help you recover. Diet    · You can eat your normal diet, unless your doctor gives you other instructions. If your stomach is upset, try clear liquids and bland, low-fat foods like plain toast or rice. · Drink plenty of fluids (unless your doctor tells you not to). · Don't drink alcohol for 24 hours. Medicines    · Be safe with medicines. Read and follow all instructions on the label. · If the doctor gave you a prescription medicine for pain, take it as prescribed. · If you are not taking a prescription pain medicine, ask your doctor if you can take an over-the-counter medicine. · If you think your pain medicine is making you sick to your stomach:  · Take your medicine after meals (unless your doctor has told you not to). · Ask your doctor for a different pain medicine. Percutaneous Coronary Intervention: What to Expect at [facility]     Percutaneous coronary intervention (PCI) is the name for procedures that are used to open a narrowed or blocked coronary artery.  The two most common PCI procedures are coronary angioplasty and coronary stent placement. Your groin or arm may have a bruise and feel sore for a day or two after a percutaneous coronary intervention (PCI). You can do light activities around the house, but nothing strenuous for several days. This care sheet gives you a general idea about how long it will take for you to recover. But each person recovers at a different pace. Follow the steps below to get better as quickly as possible. How can you care for yourself at home? Activity  · Do not do strenuous exercise and do not lift, pull, or push anything heavy until your doctor says it is okay. This may be for a day or two. You can walk around the house and do light activity, such as cooking. · You may shower 24 to 48 hours after the procedure, if your doctor okays it. Pat the incision dry. Do not take a bath for 1 week, or until your doctor tells you it is okay. · If the catheter was placed in your groin, try not to walk up stairs for the first couple of days. · If the catheter was placed in your arm near your wrist, do not bend your wrist deeply for the first couple of days. Be careful using your hand to get into and out of a chair or bed. · If your doctor recommends it, get more exercise. Walking is a good choice. Bit by bit, increase the amount you walk every day. Try for at least 30 minutes on most days of the week. Diet  · Drink plenty of fluids to help your body flush out the dye. If you have kidney, heart, or liver disease and have to limit fluids, talk with your doctor before you increase the amount of fluids you drink. · Keep eating a heart-healthy diet that has lots of fruits, vegetables, and whole grains. If you have not been eating this way, talk to your doctor. You also may want to talk to a dietitian. This expert can help you to learn about healthy foods and plan meals. Medicines  · Your doctor will tell you if and when you can restart your medicines.  He or she will also give you instructions about taking any new medicines. · If you take blood thinners, such as warfarin (Coumadin), clopidogrel (Plavix), or aspirin, be sure to talk to your doctor. He or she will tell you if and when to start taking those medicines again. Make sure that you understand exactly what your doctor wants you to do. · Your doctor will prescribe blood-thinning medicines. You will likely take aspirin plus another antiplatelet, such as clopidogrel (Plavix). It is very important that you take these medicines exactly as directed. These medicines help keep the coronary artery open and reduce your risk of a heart attack. · Call your doctor if you think you are having a problem with your medicine. Care of the catheter site  · For 1 or 2 days, keep a bandage over the spot where the catheter was inserted. The bandage probably will fall off in this time. · Put ice or a cold pack on the area for 10 to 20 minutes at a time to help with soreness or swelling. Put a thin cloth between the ice and your skin. Follow-up care is a key part of your treatment and safety. Be sure to make and go to all appointments, and call your doctor if you are having problems. It's also a good idea to know your test results and keep a list of the medicines you take. When should you call for help? Call 911 anytime you think you may need emergency care. For example, call if:  · You passed out (lost consciousness). · You have severe trouble breathing. · You have sudden chest pain and shortness of breath, or you cough up blood. · You have symptoms of a heart attack, such as:  ¨ Chest pain or pressure. ¨ Sweating. ¨ Shortness of breath. ¨ Nausea or vomiting. ¨ Pain that spreads from the chest to the neck, jaw, or one or both shoulders or arms. ¨ Dizziness or lightheadedness. ¨ A fast or uneven pulse. After calling 911, chew 1 adult-strength aspirin. Wait for an ambulance. Do not try to drive yourself.   · You have been diagnosed with angina, and you have angina symptoms that do not go away with rest or are not getting better within 5 minutes after you take one dose of nitroglycerin. Call your doctor now or seek immediate medical care if:  · You are bleeding from the area where the catheter was put in your artery. · You have a fast-growing, painful lump at the catheter site. · You have signs of infection, such as:  ¨ Increased pain, swelling, warmth, or redness. ¨ Red streaks leading from the catheter site. ¨ Pus draining from the catheter site. ¨ A fever. · Your leg or arm looks blue or feels cold, numb, or tingly. Watch closely for changes in your health, and be sure to contact your doctor if you have any problems. Where can you learn more? Go to YouSticker.be  Enter J819 in the search box to learn more about \"Percutaneous Coronary Intervention: What to Expect at Home. \"   © 8890-2437 [employer]. Care instructions adapted under license by [employer] (which disclaims liability or warranty for this information). This care instruction is for use with your licensed healthcare professional. If you have questions about a medical condition or this instruction, always ask your healthcare professional. [place] [address] any warranty or liability for your use of this information. Content Version: 88.0.067300; Current as of: May 22, 2015         Cardiac Rehabilitation: Care Instructions  Your Care Instructions    Cardiac rehabilitation is a program for people who have a heart problem, such as a heart attack, heart failure, or a heart valve disease. The program includes exercise, lifestyle changes, education, and emotional support. Cardiac rehab can help you improve the quality of your life through better overall health. It can help you lose weight and feel better about yourself.   On your cardiac rehab team, you may have your doctor, a nurse specialist, a dietitian, and a physical therapist. They will design your cardiac rehab program specifically for you. You will learn how to reduce your risk for heart problems, how to manage stress, and how to eat a heart-healthy diet. By the end of the program, you will be ready to maintain a healthier lifestyle on your own. Follow-up care is a key part of your treatment and safety. Be sure to make and go to all appointments, and call your doctor if you are having problems. It's also a good idea to know your test results and keep a list of the medicines you take. How can you care for yourself at home? · Take your medicines exactly as prescribed. Call your doctor if you think you are having a problem with your medicine. You will get more details on the specific medicines your doctor prescribes. · Weigh yourself every day if your doctor tells you to. Watch for sudden weight gain. Weigh yourself on the same scale with the same amount of clothing at the same time of day. · Plan your meals so that you are eating heart-healthy foods. ¨ Eat a variety of foods daily. Fresh fruits and vegetables and whole-grains are good choices. ¨ Limit your fat intake, especially saturated and trans fat. ¨ Limit salt (sodium). ¨ Increase fiber in your diet. ¨ Limit alcohol. · Learn how to take your pulse so that you can track your heart rate during exercise. · Always check with your doctor before you begin a new exercise program.  · Warm up before you exercise and cool down afterward for at least 15 minutes each. This will help your heart gradually prepare for and recover from exercise and avoid pushing your heart too hard. · Stop exercising if you have any unusual discomfort, such as chest pain. · Do not smoke. Smoking can make heart problems worse. If you need help quitting, talk to your doctor about stop-smoking programs and medicines. These can increase your chances of quitting for good. When should you call for help?   Call 911 anytime you think you may need emergency care. For example, call if:  · You have severe trouble breathing. · You cough up pink, foamy mucus and you have trouble breathing. · You have symptoms of a heart attack. These may include:  ¨ Chest pain or pressure, or a strange feeling in the chest.  ¨ Sweating. ¨ Shortness of breath. ¨ Nausea or vomiting. ¨ Pain, pressure, or a strange feeling in the back, neck, jaw, or upper belly or in one or both shoulders or arms. ¨ Lightheadedness or sudden weakness. ¨ A fast or irregular heartbeat. After you call 911, the  may tell you to chew 1 adult-strength or 2 to 4 low-dose aspirin. Wait for an ambulance. Do not try to drive yourself. · You have angina symptoms (such as chest pain or pressure) that do not go away with rest or are not getting better within 5 minutes after you take a dose of nitroglycerin. · You have symptoms of a stroke. These may include:  ¨ Sudden numbness, tingling, weakness, or loss of movement in your face, arm, or leg, especially on only one side of your body. ¨ Sudden vision changes. ¨ Sudden trouble speaking. ¨ Sudden confusion or trouble understanding simple statements. ¨ Sudden problems with walking or balance. ¨ A sudden, severe headache that is different from past headaches. · You passed out (lost consciousness). Call your doctor now or seek immediate medical care if:  · You have new or increased shortness of breath. · You are dizzy or lightheaded, or you feel like you may faint. · You gain weight suddenly, such as 3 pounds or more in 2 to 3 days. (Your doctor may suggest a different range of weight gain.)  · You have increased swelling in your legs, ankles, or feet. Watch closely for changes in your health, and be sure to contact your doctor if you have any problems. Where can you learn more? Go to http://www.gray.com/.   Enter Z069 in the search box to learn more about \"Cardiac Rehabilitation: Care Instructions. \"  Current as of: May 5, 2016  Content Version: 11.1  © 2345-6547 [employer]. Care instructions adapted under license by [employer] (which disclaims liability or warranty for this information). If you have questions about a medical condition or this instruction, always ask your healthcare professional. [address] any warranty or liability for your use of this information. Dekkohart Activation    Thank you for requesting access to Queryday. Please follow the instructions below to securely access and download your online medical record. Queryday allows you to send messages to your doctor, view your test results, renew your prescriptions, schedule appointments, and more. How Do I Sign Up? 1. In your internet browser, go to [email or web address]. 2. Click on the First Time User? Click Here link in the Sign In box. You will see the New Member Sign Up page. 3. Enter your Queryday Access Code exactly as it appears below. You will not need to use this code after youve completed the sign-up process. If you do not sign up before the expiration date, you must request a new code. Queryday Access Code: Activation code not generated  Current Queryday Status: Active (This is the date your Queryday access code will )    4. Enter the last four digits of your Social Security Number (xxxx) and Date of Birth (mm/dd/yyyy) as indicated and click Submit. You will be taken to the next sign-up page. 5. Create a Queryday ID. This will be your Queryday login ID and cannot be changed, so think of one that is secure and easy to remember. 6. Create a Queryday password. You can change your password at any time. 7. Enter your Password Reset Question and Answer. This can be used at a later time if you forget your password. 8. Enter your e-mail address. You will receive e-mail notification when new information is available in [address] 9. Click Sign Up. You can now view and download portions of your medical record. 10. Click the Download Summary menu link to download a portable copy of your medical information. Additional Information    If you have questions, please visit the Frequently Asked Questions section of the [employer] [place] website at [email or web address] Remember, [employer] [place] is NOT to be used for urgent needs. For medical emergencies, dial 911.

## 2022-03-21 NOTE — PROGRESS NOTES
TRANSFER - OUT REPORT:    [facility] with Dr. Wili Boone  Right radial access  1 stent to PDA    TR band applied to right radial with 13 ml in band  No bleeding or hematoma, site soft    4 mg versed  75 mcg Fentanyl   13,000 units heparin  600 mg plavix  30 ml mylanta    Verbal report given to Luly Adan on Rylie Graham  being transferred to [facility] for routine progression of care       Report consisted of patients Situation, Background, Assessment and Recommendations(SBAR). Information from the following report(s) Procedure Summary and MAR was reviewed with the receiving nurse. Opportunity for questions and clarification was provided.       Patient transported with:   Registered Nurse

## 2022-03-21 NOTE — ROUTINE PROCESS
Patient received to [address] [place] [address] room # 15  Ambulatory from [facility]. Patient scheduled for [facility] today with Dr Pal Mueller. Procedure reviewed & questions answered, voiced good understanding consent obtained & placed on chart. All medications and medical history reviewed. Will prep patient per orders. Patient & family updated on plan of care. The patient has a fraility score of 3-MANAGING WELL, based on reports no recent falls.     ASA 81 mg x 4 @ 0600

## 2022-03-21 NOTE — Clinical Note
Application for Continuing Coverage form Received at , Save to [employer], encounter routed. Application is due by 7/8/21, would like copy emailed back if possible to [email or web address]   Diagnostic wire inserted.

## 2022-03-21 NOTE — PROGRESS NOTES
TRANSFER - IN REPORT:    Verbal report received from [address] RN(name) on Myrtie Prom  being received from cath lab(unit) for routine progression of care      Report consisted of patients Situation, Background, Assessment and   Recommendations(SBAR). Information from the following report(s) Procedure Summary was reviewed with the receiving nurse. Opportunity for questions and clarification was provided. Assessment completed upon patients arrival to unit and care assumed.

## 2022-03-22 NOTE — PROGRESS NOTES
Called to follow up after discharge on same day as stent placement. No answer message left stressing the importance of continuing to take asa & plavix daily, inquiring on how he was feeling if any chest pain or shortness of breath & Right radial site.  Left my number for return call

## 2022-03-24 PROBLEM — I25.10 CORONARY ARTERY DISEASE INVOLVING NATIVE CORONARY ARTERY OF NATIVE HEART WITHOUT ANGINA PECTORIS: Status: ACTIVE | Noted: 2022-03-21

## 2022-03-24 PROBLEM — E78.49 OTHER HYPERLIPIDEMIA: Status: ACTIVE | Noted: 2022-03-15

## 2022-03-24 PROBLEM — R31.21 ASYMPTOMATIC MICROSCOPIC HEMATURIA: Status: ACTIVE | Noted: 2022-03-15

## 2022-03-24 PROBLEM — R17 ELEVATED BILIRUBIN: Status: ACTIVE | Noted: 2022-03-15

## 2022-03-24 PROBLEM — M67.479 GANGLION CYST OF FOOT: Status: ACTIVE | Noted: 2022-03-15

## 2022-03-24 PROBLEM — I50.43 ACUTE ON CHRONIC COMBINED SYSTOLIC AND DIASTOLIC CONGESTIVE HEART FAILURE (HCC): Status: ACTIVE | Noted: 2022-03-15

## 2022-03-31 PROBLEM — R79.89 ELEVATED TROPONIN: Status: RESOLVED | Noted: 2022-03-01 | Resolved: 2022-03-31

## 2022-03-31 PROBLEM — R77.8 ELEVATED TROPONIN: Status: RESOLVED | Noted: 2022-03-01 | Resolved: 2022-03-31

## 2022-12-14 RX ORDER — CARVEDILOL 25 MG/1
TABLET ORAL
Qty: 60 TABLET | Refills: 0 | Status: SHIPPED | OUTPATIENT
Start: 2022-12-14

## 2022-12-16 RX ORDER — CARVEDILOL 25 MG/1
TABLET ORAL
Qty: 60 TABLET | Refills: 0 | OUTPATIENT
Start: 2022-12-16

## 2022-12-16 NOTE — TELEPHONE ENCOUNTER
Prescription sent in 12.14.22 for 30 days with note that patient needs appointment also have left voice mail for patient to call for an appointment//[email or web address]

## 2023-02-06 ENCOUNTER — TELEPHONE (OUTPATIENT)
Dept: INTERNAL MEDICINE CLINIC | Facility: CLINIC | Age: 58
End: 2023-02-06

## 2023-02-06 NOTE — TELEPHONE ENCOUNTER
----- Message from Bridgette Roche sent at 2/6/2023  2:50 PM EST -----  Subject: Appointment Request    Reason for Call: Established Patient Appointment needed: Routine Physical   Exam    QUESTIONS    Reason for appointment request? Requested Provider unavailable - Lakisha Chávez      Additional Information for Provider? Pt would like to schedule an appt   with PCP as soon as possible for physical and med refills. No appts   populated for provider. Please contact as soon as possible to discuss   options with pt.   ---------------------------------------------------------------------------  --------------  Luda Farias INFO  [phone]; OK to leave message on voicemail    I called and LMOM to notify the pt that Dr. Ruth Wolfe is no longer with our practice, but we would be glad to schedule him with another one of our providers. I asked that he call back to schedule.

## 2023-02-21 PROBLEM — R17 ELEVATED BILIRUBIN: Status: RESOLVED | Noted: 2022-03-15 | Resolved: 2023-02-21

## 2023-02-21 SDOH — HEALTH STABILITY: PHYSICAL HEALTH: ON AVERAGE, HOW MANY DAYS PER WEEK DO YOU ENGAGE IN MODERATE TO STRENUOUS EXERCISE (LIKE A BRISK WALK)?: 0 DAYS
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
## 2023-02-21 NOTE — ASSESSMENT & PLAN NOTE
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 Continue to monitor. Normal right upper quadrant ultrasound 03/2022.

## 2023-02-22 ENCOUNTER — OFFICE VISIT (OUTPATIENT)
Dept: FAMILY MEDICINE CLINIC | Facility: CLINIC | Age: 58
End: 2023-02-22
Payer: COMMERCIAL

## 2023-02-22 VITALS
HEIGHT: 73 IN | DIASTOLIC BLOOD PRESSURE: 74 MMHG | BODY MASS INDEX: 37.24 KG/M2 | OXYGEN SATURATION: 92 % | HEART RATE: 62 BPM | SYSTOLIC BLOOD PRESSURE: 116 MMHG | WEIGHT: 281 LBS

## 2023-02-22 DIAGNOSIS — G62.9 PERIPHERAL POLYNEUROPATHY: ICD-10-CM

## 2023-02-22 DIAGNOSIS — I10 ESSENTIAL HYPERTENSION: ICD-10-CM

## 2023-02-22 DIAGNOSIS — I50.22 CHRONIC SYSTOLIC CONGESTIVE HEART FAILURE (HCC): Primary | ICD-10-CM

## 2023-02-22 DIAGNOSIS — R31.21 ASYMPTOMATIC MICROSCOPIC HEMATURIA: ICD-10-CM

## 2023-02-22 DIAGNOSIS — Z12.5 PROSTATE CANCER SCREENING: ICD-10-CM

## 2023-02-22 DIAGNOSIS — I35.1 NONRHEUMATIC AORTIC VALVE INSUFFICIENCY: ICD-10-CM

## 2023-02-22 DIAGNOSIS — E80.6 HYPERBILIRUBINEMIA: ICD-10-CM

## 2023-02-22 DIAGNOSIS — Z23 IMMUNIZATION DUE: ICD-10-CM

## 2023-02-22 DIAGNOSIS — I25.10 CORONARY ARTERY DISEASE INVOLVING NATIVE CORONARY ARTERY OF NATIVE HEART WITHOUT ANGINA PECTORIS: ICD-10-CM

## 2023-02-22 DIAGNOSIS — E78.49 OTHER HYPERLIPIDEMIA: ICD-10-CM

## 2023-02-22 LAB
ALBUMIN SERPL-MCNC: 3.6 G/DL (ref 3.5–5)
ALBUMIN/GLOB SERPL: 1.1 (ref 0.4–1.6)
ALP SERPL-CCNC: 92 U/L (ref 50–136)
ALT SERPL-CCNC: 49 U/L (ref 12–65)
ANION GAP SERPL CALC-SCNC: 6 MMOL/L (ref 2–11)
AST SERPL-CCNC: 19 U/L (ref 15–37)
BASOPHILS # BLD: 0.1 K/UL (ref 0–0.2)
BASOPHILS NFR BLD: 1 % (ref 0–2)
BILIRUB SERPL-MCNC: 1.5 MG/DL (ref 0.2–1.1)
BUN SERPL-MCNC: 18 MG/DL (ref 6–23)
CALCIUM SERPL-MCNC: 9 MG/DL (ref 8.3–10.4)
CHLORIDE SERPL-SCNC: 105 MMOL/L (ref 101–110)
CHOLEST SERPL-MCNC: 195 MG/DL
CO2 SERPL-SCNC: 29 MMOL/L (ref 21–32)
CREAT SERPL-MCNC: 1.1 MG/DL (ref 0.8–1.5)
DIFFERENTIAL METHOD BLD: NORMAL
EOSINOPHIL # BLD: 0.1 K/UL (ref 0–0.8)
EOSINOPHIL NFR BLD: 1 % (ref 0.5–7.8)
ERYTHROCYTE [DISTWIDTH] IN BLOOD BY AUTOMATED COUNT: 13.1 % (ref 11.9–14.6)
GLOBULIN SER CALC-MCNC: 3.3 G/DL (ref 2.8–4.5)
GLUCOSE SERPL-MCNC: 119 MG/DL (ref 65–100)
HCT VFR BLD AUTO: 45.8 % (ref 41.1–50.3)
HDLC SERPL-MCNC: 40 MG/DL (ref 40–60)
HDLC SERPL: 4.9
HGB BLD-MCNC: 15.9 G/DL (ref 13.6–17.2)
IMM GRANULOCYTES # BLD AUTO: 0 K/UL (ref 0–0.5)
IMM GRANULOCYTES NFR BLD AUTO: 0 % (ref 0–5)
LDLC SERPL CALC-MCNC: 121 MG/DL
LYMPHOCYTES # BLD: 1.1 K/UL (ref 0.5–4.6)
LYMPHOCYTES NFR BLD: 18 % (ref 13–44)
MCH RBC QN AUTO: 31.5 PG (ref 26.1–32.9)
MCHC RBC AUTO-ENTMCNC: 34.7 G/DL (ref 31.4–35)
MCV RBC AUTO: 90.7 FL (ref 82–102)
MONOCYTES # BLD: 0.4 K/UL (ref 0.1–1.3)
MONOCYTES NFR BLD: 7 % (ref 4–12)
NEUTS SEG # BLD: 4.4 K/UL (ref 1.7–8.2)
NEUTS SEG NFR BLD: 73 % (ref 43–78)
NRBC # BLD: 0 K/UL (ref 0–0.2)
PLATELET # BLD AUTO: 206 K/UL (ref 150–450)
PMV BLD AUTO: 9.9 FL (ref 9.4–12.3)
POTASSIUM SERPL-SCNC: 4.3 MMOL/L (ref 3.5–5.1)
PROT SERPL-MCNC: 6.9 G/DL (ref 6.3–8.2)
PSA SERPL-MCNC: 1.8 NG/ML
RBC # BLD AUTO: 5.05 M/UL (ref 4.23–5.6)
SODIUM SERPL-SCNC: 140 MMOL/L (ref 133–143)
TRIGL SERPL-MCNC: 170 MG/DL (ref 35–150)
TSH W FREE THYROID IF ABNORMAL: 1.31 UIU/ML (ref 0.36–3.74)
VIT B12 SERPL-MCNC: 493 PG/ML (ref 193–986)
VLDLC SERPL CALC-MCNC: 34 MG/DL (ref 6–23)
WBC # BLD AUTO: 6 K/UL (ref 4.3–11.1)

## 2023-02-22 PROCEDURE — 99396 PREV VISIT EST AGE 40-64: CPT | Performed by: FAMILY MEDICINE

## 2023-02-22 PROCEDURE — 3078F DIAST BP <80 MM HG: CPT | Performed by: FAMILY MEDICINE

## 2023-02-22 PROCEDURE — 3074F SYST BP LT 130 MM HG: CPT | Performed by: FAMILY MEDICINE

## 2023-02-22 RX ORDER — ATORVASTATIN CALCIUM 40 MG/1
40 TABLET, FILM COATED ORAL DAILY
Qty: 90 TABLET | Refills: 3 | Status: SHIPPED | OUTPATIENT
Start: 2023-02-22

## 2023-02-22 RX ORDER — CLOPIDOGREL BISULFATE 75 MG/1
75 TABLET ORAL DAILY
Qty: 30 TABLET | Refills: 0 | Status: SHIPPED | OUTPATIENT
Start: 2023-02-22

## 2023-02-22 RX ORDER — CARVEDILOL 25 MG/1
TABLET ORAL
Qty: 180 TABLET | Refills: 3 | Status: SHIPPED | OUTPATIENT
Start: 2023-02-22

## 2023-02-22 RX ORDER — POTASSIUM CHLORIDE 750 MG/1
10 TABLET, EXTENDED RELEASE ORAL DAILY
Qty: 90 TABLET | Refills: 3 | Status: SHIPPED | OUTPATIENT
Start: 2023-02-22

## 2023-02-22 RX ORDER — LISINOPRIL 20 MG/1
20 TABLET ORAL DAILY
Qty: 90 TABLET | Refills: 3 | Status: SHIPPED | OUTPATIENT
Start: 2023-02-22

## 2023-02-22 RX ORDER — FUROSEMIDE 40 MG/1
40 TABLET ORAL DAILY
Qty: 90 TABLET | Refills: 3 | Status: SHIPPED | OUTPATIENT
Start: 2023-02-22

## 2023-02-22 RX ORDER — ZOSTER VACCINE RECOMBINANT, ADJUVANTED 50 MCG/0.5
0.5 KIT INTRAMUSCULAR SEE ADMIN INSTRUCTIONS
Qty: 0.5 ML | Refills: 0 | Status: SHIPPED | OUTPATIENT
Start: 2023-02-22 | End: 2023-08-21

## 2023-02-22 SDOH — ECONOMIC STABILITY: HOUSING INSECURITY
IN THE LAST 12 MONTHS, WAS THERE A TIME WHEN YOU DID NOT HAVE A STEADY PLACE TO SLEEP OR SLEPT IN A SHELTER (INCLUDING NOW)?: NO

## 2023-02-22 SDOH — ECONOMIC STABILITY: INCOME INSECURITY: HOW HARD IS IT FOR YOU TO PAY FOR THE VERY BASICS LIKE FOOD, HOUSING, MEDICAL CARE, AND HEATING?: NOT HARD AT ALL

## 2023-02-22 SDOH — ECONOMIC STABILITY: FOOD INSECURITY: WITHIN THE PAST 12 MONTHS, YOU WORRIED THAT YOUR FOOD WOULD RUN OUT BEFORE YOU GOT MONEY TO BUY MORE.: NEVER TRUE

## 2023-02-22 SDOH — ECONOMIC STABILITY: FOOD INSECURITY: WITHIN THE PAST 12 MONTHS, THE FOOD YOU BOUGHT JUST DIDN'T LAST AND YOU DIDN'T HAVE MONEY TO GET MORE.: NEVER TRUE

## 2023-02-22 ASSESSMENT — PATIENT HEALTH QUESTIONNAIRE - PHQ9
SUM OF ALL RESPONSES TO PHQ9 QUESTIONS 1 & 2: 0
SUM OF ALL RESPONSES TO PHQ QUESTIONS 1-9: 0
2. FEELING DOWN, DEPRESSED OR HOPELESS: 0
1. LITTLE INTEREST OR PLEASURE IN DOING THINGS: 0
SUM OF ALL RESPONSES TO PHQ QUESTIONS 1-9: 0

## 2023-02-22 ASSESSMENT — ENCOUNTER SYMPTOMS
WHEEZING: 0
SHORTNESS OF BREATH: 0
CONSTIPATION: 0
BACK PAIN: 0
DIARRHEA: 0
CHEST TIGHTNESS: 0

## 2023-02-23 DIAGNOSIS — Z23 IMMUNIZATION DUE: ICD-10-CM

## 2023-02-23 DIAGNOSIS — I50.22 CHRONIC SYSTOLIC CONGESTIVE HEART FAILURE (HCC): ICD-10-CM

## 2023-02-23 DIAGNOSIS — I25.10 CORONARY ARTERY DISEASE INVOLVING NATIVE CORONARY ARTERY OF NATIVE HEART WITHOUT ANGINA PECTORIS: ICD-10-CM

## 2023-02-23 DIAGNOSIS — E78.49 OTHER HYPERLIPIDEMIA: ICD-10-CM

## 2023-02-23 DIAGNOSIS — I10 ESSENTIAL HYPERTENSION: ICD-10-CM

## 2023-02-24 RX ORDER — LISINOPRIL 20 MG/1
20 TABLET ORAL DAILY
Qty: 90 TABLET | Refills: 3 | OUTPATIENT
Start: 2023-02-24

## 2023-02-24 RX ORDER — CLOPIDOGREL BISULFATE 75 MG/1
75 TABLET ORAL DAILY
Qty: 30 TABLET | Refills: 0 | OUTPATIENT
Start: 2023-02-24

## 2023-02-24 RX ORDER — FUROSEMIDE 40 MG/1
40 TABLET ORAL DAILY
Qty: 90 TABLET | Refills: 3 | OUTPATIENT
Start: 2023-02-24

## 2023-02-24 RX ORDER — ZOSTER VACCINE RECOMBINANT, ADJUVANTED 50 MCG/0.5
0.5 KIT INTRAMUSCULAR SEE ADMIN INSTRUCTIONS
Qty: 0.5 ML | Refills: 0 | OUTPATIENT
Start: 2023-02-24 | End: 2023-08-23

## 2023-02-24 RX ORDER — CARVEDILOL 25 MG/1
TABLET ORAL
Qty: 180 TABLET | Refills: 3 | OUTPATIENT
Start: 2023-02-24

## 2023-02-24 RX ORDER — ATORVASTATIN CALCIUM 40 MG/1
40 TABLET, FILM COATED ORAL DAILY
Qty: 90 TABLET | Refills: 3 | OUTPATIENT
Start: 2023-02-24

## 2023-02-24 RX ORDER — POTASSIUM CHLORIDE 750 MG/1
10 TABLET, EXTENDED RELEASE ORAL DAILY
Qty: 90 TABLET | Refills: 3 | OUTPATIENT
Start: 2023-02-24

## 2023-03-14 ENCOUNTER — PATIENT MESSAGE (OUTPATIENT)
Dept: FAMILY MEDICINE CLINIC | Facility: CLINIC | Age: 58
End: 2023-03-14

## 2023-03-14 DIAGNOSIS — G25.81 RESTLESS LEG SYNDROME: Primary | ICD-10-CM

## 2023-03-14 RX ORDER — ROPINIROLE 0.25 MG/1
TABLET, FILM COATED ORAL
Qty: 180 TABLET | Refills: 2 | Status: SHIPPED | OUTPATIENT
Start: 2023-03-14

## 2023-03-14 NOTE — TELEPHONE ENCOUNTER
Diagnoses and all orders for this visit:    Restless leg syndrome  -     rOPINIRole (REQUIP) 0.25 MG tablet; 1-2 tabs PO QHS prn restless legs    Steven Baltazar

## 2023-03-14 NOTE — TELEPHONE ENCOUNTER
From: Aniceto Bauer  To: Dr. Nelly Renee: 3/14/2023 1:41 PM EDT  Subject: Restless Leg Syndrome     We discussed restless leg syndrome along with a family history of this in our visit. Is it possible to get a prescription for this? I have tried OTC meds for this and they do not work. Thank you!

## 2023-05-22 DIAGNOSIS — I25.10 CORONARY ARTERY DISEASE INVOLVING NATIVE CORONARY ARTERY OF NATIVE HEART WITHOUT ANGINA PECTORIS: ICD-10-CM

## 2023-06-09 RX ORDER — CLOPIDOGREL BISULFATE 75 MG/1
TABLET ORAL
Qty: 30 TABLET | Refills: 0 | OUTPATIENT
Start: 2023-06-09

## 2023-11-16 DIAGNOSIS — G25.81 RESTLESS LEG SYNDROME: ICD-10-CM

## 2023-11-16 RX ORDER — ROPINIROLE 0.25 MG/1
TABLET, FILM COATED ORAL
Qty: 180 TABLET | Refills: 2 | OUTPATIENT
Start: 2023-11-16

## 2023-11-16 NOTE — TELEPHONE ENCOUNTER
Patient last seen 2/22/23. Told to return in 6 months but no showed to that appt. Called patient to schedule visit, no answer. RX denied as patient needs a follow up appt.

## 2024-01-16 ASSESSMENT — PATIENT HEALTH QUESTIONNAIRE - PHQ9
SUM OF ALL RESPONSES TO PHQ QUESTIONS 1-9: 0
2. FEELING DOWN, DEPRESSED OR HOPELESS: NOT AT ALL
SUM OF ALL RESPONSES TO PHQ QUESTIONS 1-9: 0
1. LITTLE INTEREST OR PLEASURE IN DOING THINGS: NOT AT ALL
SUM OF ALL RESPONSES TO PHQ9 QUESTIONS 1 & 2: 0
SUM OF ALL RESPONSES TO PHQ QUESTIONS 1-9: 0
2. FEELING DOWN, DEPRESSED OR HOPELESS: 0
1. LITTLE INTEREST OR PLEASURE IN DOING THINGS: 0
SUM OF ALL RESPONSES TO PHQ QUESTIONS 1-9: 0
SUM OF ALL RESPONSES TO PHQ9 QUESTIONS 1 & 2: 0
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
## 2024-01-22 ENCOUNTER — OFFICE VISIT (OUTPATIENT)
Dept: FAMILY MEDICINE CLINIC | Facility: CLINIC | Age: 59
End: 2024-01-22
Payer: COMMERCIAL

## 2024-01-22 VITALS
HEART RATE: 68 BPM | BODY MASS INDEX: 37.91 KG/M2 | SYSTOLIC BLOOD PRESSURE: 136 MMHG | OXYGEN SATURATION: 97 % | DIASTOLIC BLOOD PRESSURE: 80 MMHG | TEMPERATURE: 98.4 F | HEIGHT: 73 IN | WEIGHT: 286 LBS

## 2024-01-22 DIAGNOSIS — E78.49 OTHER HYPERLIPIDEMIA: ICD-10-CM

## 2024-01-22 DIAGNOSIS — G25.81 RESTLESS LEG SYNDROME: ICD-10-CM

## 2024-01-22 DIAGNOSIS — I10 ESSENTIAL HYPERTENSION: ICD-10-CM

## 2024-01-22 DIAGNOSIS — I25.10 CORONARY ARTERY DISEASE INVOLVING NATIVE CORONARY ARTERY OF NATIVE HEART WITHOUT ANGINA PECTORIS: ICD-10-CM

## 2024-01-22 DIAGNOSIS — Z12.11 COLON CANCER SCREENING: ICD-10-CM

## 2024-01-22 DIAGNOSIS — R29.818 SUSPECTED SLEEP APNEA: ICD-10-CM

## 2024-01-22 DIAGNOSIS — I50.22 CHRONIC SYSTOLIC CONGESTIVE HEART FAILURE (HCC): ICD-10-CM

## 2024-01-22 DIAGNOSIS — R31.21 ASYMPTOMATIC MICROSCOPIC HEMATURIA: ICD-10-CM

## 2024-01-22 DIAGNOSIS — R73.09 BLOOD GLUCOSE ABNORMAL: ICD-10-CM

## 2024-01-22 DIAGNOSIS — I10 ESSENTIAL HYPERTENSION: Primary | ICD-10-CM

## 2024-01-22 PROBLEM — I50.43 ACUTE ON CHRONIC COMBINED SYSTOLIC AND DIASTOLIC CONGESTIVE HEART FAILURE (HCC): Status: RESOLVED | Noted: 2022-03-15 | Resolved: 2024-01-22

## 2024-01-22 LAB
ALBUMIN SERPL-MCNC: 3.6 G/DL (ref 3.2–4.6)
ALBUMIN/GLOB SERPL: 1.3 (ref 0.4–1.6)
ALP SERPL-CCNC: 115 U/L (ref 50–136)
ALT SERPL-CCNC: 37 U/L (ref 12–65)
ANION GAP SERPL CALC-SCNC: 3 MMOL/L (ref 2–11)
AST SERPL-CCNC: 19 U/L (ref 15–37)
BASOPHILS # BLD: 0.1 K/UL (ref 0–0.2)
BASOPHILS NFR BLD: 1 % (ref 0–2)
BILIRUB SERPL-MCNC: 1.6 MG/DL (ref 0.2–1.1)
BUN SERPL-MCNC: 20 MG/DL (ref 8–23)
CALCIUM SERPL-MCNC: 8.6 MG/DL (ref 8.3–10.4)
CHLORIDE SERPL-SCNC: 107 MMOL/L (ref 103–113)
CHOLEST SERPL-MCNC: 110 MG/DL
CO2 SERPL-SCNC: 32 MMOL/L (ref 21–32)
CREAT SERPL-MCNC: 1.5 MG/DL (ref 0.8–1.5)
DIFFERENTIAL METHOD BLD: NORMAL
EOSINOPHIL # BLD: 0.1 K/UL (ref 0–0.8)
EOSINOPHIL NFR BLD: 2 % (ref 0.5–7.8)
ERYTHROCYTE [DISTWIDTH] IN BLOOD BY AUTOMATED COUNT: 12.8 % (ref 11.9–14.6)
GLOBULIN SER CALC-MCNC: 2.7 G/DL (ref 2.8–4.5)
GLUCOSE SERPL-MCNC: 118 MG/DL (ref 65–100)
HCT VFR BLD AUTO: 42.8 % (ref 41.1–50.3)
HDLC SERPL-MCNC: 35 MG/DL (ref 40–60)
HDLC SERPL: 3.1
HGB BLD-MCNC: 14.7 G/DL (ref 13.6–17.2)
IMM GRANULOCYTES # BLD AUTO: 0 K/UL (ref 0–0.5)
IMM GRANULOCYTES NFR BLD AUTO: 0 % (ref 0–5)
LDLC SERPL CALC-MCNC: 30.6 MG/DL
LYMPHOCYTES # BLD: 1.6 K/UL (ref 0.5–4.6)
LYMPHOCYTES NFR BLD: 22 % (ref 13–44)
MCH RBC QN AUTO: 31.1 PG (ref 26.1–32.9)
MCHC RBC AUTO-ENTMCNC: 34.3 G/DL (ref 31.4–35)
MCV RBC AUTO: 90.5 FL (ref 82–102)
MONOCYTES # BLD: 0.5 K/UL (ref 0.1–1.3)
MONOCYTES NFR BLD: 7 % (ref 4–12)
NEUTS SEG # BLD: 5.1 K/UL (ref 1.7–8.2)
NEUTS SEG NFR BLD: 68 % (ref 43–78)
NRBC # BLD: 0 K/UL (ref 0–0.2)
PLATELET # BLD AUTO: 198 K/UL (ref 150–450)
PMV BLD AUTO: 10.3 FL (ref 9.4–12.3)
POTASSIUM SERPL-SCNC: 4 MMOL/L (ref 3.5–5.1)
PROT SERPL-MCNC: 6.3 G/DL (ref 6.3–8.2)
RBC # BLD AUTO: 4.73 M/UL (ref 4.23–5.6)
SODIUM SERPL-SCNC: 142 MMOL/L (ref 136–146)
TRIGL SERPL-MCNC: 222 MG/DL (ref 35–150)
TSH W FREE THYROID IF ABNORMAL: 1.2 UIU/ML (ref 0.36–3.74)
VLDLC SERPL CALC-MCNC: 44.4 MG/DL (ref 6–23)
WBC # BLD AUTO: 7.4 K/UL (ref 4.3–11.1)

## 2024-01-22 PROCEDURE — 3079F DIAST BP 80-89 MM HG: CPT | Performed by: FAMILY MEDICINE

## 2024-01-22 PROCEDURE — 3075F SYST BP GE 130 - 139MM HG: CPT | Performed by: FAMILY MEDICINE

## 2024-01-22 PROCEDURE — 99214 OFFICE O/P EST MOD 30 MIN: CPT | Performed by: FAMILY MEDICINE

## 2024-01-22 RX ORDER — ATORVASTATIN CALCIUM 40 MG/1
40 TABLET, FILM COATED ORAL DAILY
Qty: 90 TABLET | Refills: 3 | Status: SHIPPED | OUTPATIENT
Start: 2024-01-22

## 2024-01-22 RX ORDER — FUROSEMIDE 40 MG/1
40 TABLET ORAL DAILY
Qty: 90 TABLET | Refills: 3 | Status: SHIPPED | OUTPATIENT
Start: 2024-01-22

## 2024-01-22 RX ORDER — LISINOPRIL 20 MG/1
20 TABLET ORAL DAILY
Qty: 90 TABLET | Refills: 3 | Status: SHIPPED | OUTPATIENT
Start: 2024-01-22

## 2024-01-22 RX ORDER — POTASSIUM CHLORIDE 750 MG/1
10 TABLET, EXTENDED RELEASE ORAL DAILY
Qty: 90 TABLET | Refills: 3 | Status: SHIPPED | OUTPATIENT
Start: 2024-01-22

## 2024-01-22 RX ORDER — CARVEDILOL 25 MG/1
TABLET ORAL
Qty: 180 TABLET | Refills: 3 | Status: SHIPPED | OUTPATIENT
Start: 2024-01-22

## 2024-01-22 RX ORDER — ROPINIROLE 0.25 MG/1
TABLET, FILM COATED ORAL
Qty: 180 TABLET | Refills: 2 | Status: SHIPPED | OUTPATIENT
Start: 2024-01-22

## 2024-01-22 ASSESSMENT — ENCOUNTER SYMPTOMS
WHEEZING: 0
BACK PAIN: 0
DIARRHEA: 0
SHORTNESS OF BREATH: 0
CONSTIPATION: 0
CHEST TIGHTNESS: 0

## 2024-01-22 NOTE — ASSESSMENT & PLAN NOTE
-Patient is overdue for follow up with cardiology.  Placed referral  -ECHO 03/2022 showed EF 30-35%  -Heart cath 03/2022 with stent to PDA  -Moderate AR and mild/mod MR  -no signs of decompensation.

## 2024-01-22 NOTE — ASSESSMENT & PLAN NOTE
PCI in 2022.   -no chest pain, increased shortness of breath or decreased exercise tolerance  -taking beta blocker, aspirin and statin  -no longer on clopidogrel

## 2024-01-22 NOTE — ASSESSMENT & PLAN NOTE
Blood pressure is at goal for age.  Encouraged continued medication compliance, DASH or Mediterranean diet and daily physical activity.

## 2024-01-23 LAB
EST. AVERAGE GLUCOSE BLD GHB EST-MCNC: 120 MG/DL
HBA1C MFR BLD: 5.8 % (ref 4.8–5.6)

## 2024-04-17 ENCOUNTER — OFFICE VISIT (OUTPATIENT)
Age: 59
End: 2024-04-17
Payer: COMMERCIAL

## 2024-04-17 VITALS
BODY MASS INDEX: 37.27 KG/M2 | HEART RATE: 64 BPM | HEIGHT: 73 IN | SYSTOLIC BLOOD PRESSURE: 126 MMHG | WEIGHT: 281.2 LBS | DIASTOLIC BLOOD PRESSURE: 86 MMHG

## 2024-04-17 DIAGNOSIS — I50.22 CHRONIC SYSTOLIC CONGESTIVE HEART FAILURE (HCC): ICD-10-CM

## 2024-04-17 DIAGNOSIS — E78.49 OTHER HYPERLIPIDEMIA: ICD-10-CM

## 2024-04-17 DIAGNOSIS — I25.10 CORONARY ARTERY DISEASE INVOLVING NATIVE CORONARY ARTERY OF NATIVE HEART WITHOUT ANGINA PECTORIS: Primary | ICD-10-CM

## 2024-04-17 DIAGNOSIS — I35.1 NONRHEUMATIC AORTIC VALVE INSUFFICIENCY: ICD-10-CM

## 2024-04-17 DIAGNOSIS — I10 ESSENTIAL HYPERTENSION: ICD-10-CM

## 2024-04-17 PROCEDURE — 93000 ELECTROCARDIOGRAM COMPLETE: CPT | Performed by: INTERNAL MEDICINE

## 2024-04-17 PROCEDURE — 3079F DIAST BP 80-89 MM HG: CPT | Performed by: INTERNAL MEDICINE

## 2024-04-17 PROCEDURE — 3074F SYST BP LT 130 MM HG: CPT | Performed by: INTERNAL MEDICINE

## 2024-04-17 PROCEDURE — 99214 OFFICE O/P EST MOD 30 MIN: CPT | Performed by: INTERNAL MEDICINE

## 2024-04-17 RX ORDER — NITROGLYCERIN 0.4 MG/1
0.4 TABLET SUBLINGUAL PRN
Qty: 25 TABLET | Refills: 2 | Status: SHIPPED | OUTPATIENT
Start: 2024-04-17

## 2024-04-17 ASSESSMENT — ENCOUNTER SYMPTOMS: SHORTNESS OF BREATH: 0

## 2024-04-17 NOTE — PROGRESS NOTES
[facility], [address], [address]  [place] [address]  PHONE: [phone]    Navi Salinas  1965      SUBJECTIVE:   Navi Salinas is a 58 y.o. male seen for a follow up visit regarding the following:     Chief Complaint   Patient presents with    Coronary Artery Disease       HPI:    Patient presents for follow-up after an extended absence.  Review of records prior to his presentation shows that he was seen in March 2022 and was admitted to the hospital with hypertensive urgency and CHF.  His echocardiogram at this time showed:         Echo (3/1/22):  EF 30-35%.  Global HK.  Moderate AR.  Mild/moderate MR. Mildly dilated Aortic root.      He was started on carvedilol and lisinopril.  He did not have prescription drug coverage and could not afford Entresto.  Cardiac catheterization was performed and he underwent PCI as outlined below:    PCI of Right PDA (3/21/22):  2.5 x 15 mm Resolute [place] HADLEY. Mild disease in other segments.  EF 35-40%.    Patient never returned to schedule follow-up visits.    He is he has done well over the last several years and did not follow-up due to lack of insurance.  He does have some lower extreme edema but controlled with Lasix.  Blood pressure has been controlled.  His PCP has been prescribing his carvedilol lisinopril.  He has not had any repeat imaging performed in regards to his LV dysfunction.  He states he has an active lifestyle and is able to walk and do his yard work without significant dyspnea.        Past Medical History, Past Surgical History, Family history, Social History, and Medications were all reviewed with the patient today and updated as necessary.           Current Outpatient Medications:     nitroGLYCERIN (NITROSTAT) 0.4 MG SL tablet, Place 1 tablet under the tongue as needed for Chest pain, Disp: 25 tablet, Rfl: 2    rOPINIRole (REQUIP) 0.25 MG tablet, 1-2 tabs PO QHS prn restless legs, Disp: 180 tablet, Rfl: 2

## 2024-06-07 ENCOUNTER — OFFICE VISIT (OUTPATIENT)
Age: 59
End: 2024-06-07
Payer: COMMERCIAL

## 2024-06-07 VITALS
HEIGHT: 72 IN | SYSTOLIC BLOOD PRESSURE: 136 MMHG | HEART RATE: 64 BPM | WEIGHT: 284.4 LBS | DIASTOLIC BLOOD PRESSURE: 80 MMHG | BODY MASS INDEX: 38.52 KG/M2

## 2024-06-07 DIAGNOSIS — I25.10 CORONARY ARTERY DISEASE INVOLVING NATIVE CORONARY ARTERY OF NATIVE HEART WITHOUT ANGINA PECTORIS: ICD-10-CM

## 2024-06-07 DIAGNOSIS — I35.1 NONRHEUMATIC AORTIC VALVE INSUFFICIENCY: ICD-10-CM

## 2024-06-07 DIAGNOSIS — I71.21 ANEURYSM OF ASCENDING AORTA WITHOUT RUPTURE (HCC): ICD-10-CM

## 2024-06-07 DIAGNOSIS — I50.22 CHRONIC SYSTOLIC CONGESTIVE HEART FAILURE (HCC): Primary | ICD-10-CM

## 2024-06-07 DIAGNOSIS — I10 ESSENTIAL HYPERTENSION: ICD-10-CM

## 2024-06-07 PROCEDURE — 99214 OFFICE O/P EST MOD 30 MIN: CPT | Performed by: INTERNAL MEDICINE

## 2024-06-07 PROCEDURE — 3079F DIAST BP 80-89 MM HG: CPT | Performed by: INTERNAL MEDICINE

## 2024-06-07 PROCEDURE — 3075F SYST BP GE 130 - 139MM HG: CPT | Performed by: INTERNAL MEDICINE

## 2024-06-07 ASSESSMENT — ENCOUNTER SYMPTOMS: SHORTNESS OF BREATH: 0

## 2024-06-07 NOTE — PROGRESS NOTES
[facility], [address], [address]  [place] [address]  PHONE: [phone]    Navi Salinas  1965      SUBJECTIVE:   Navi Salinas is a 59 y.o. male seen for a follow up visit regarding the following:     Chief Complaint   Patient presents with    Coronary Artery Disease    Congestive Heart Failure    Hypertension    Results     echo       HPI:    Patient presents for follow-up.  He was last seen on May 17.  He had an extended absence and had not been seen prior to this since 2022 at which time he was admitted to the hospital with hypertensive urgency and CHF.  He also had coronary disease and underwent stenting of a right PDA.  His EF at this time was 35 to 40%.  He was scheduled an echocardiogram and instructed to follow-up to discuss results and further titration of medical therapy.  His echocardiogram shows:    Echo (5/28/2024): EF 55 to 60%.  Mild AR/MR.  Mild dilatation of acing aorta.  Max 4.5 cm at sinuses.     He denies any chest pain or dyspnea.  Blood pressure appears to be well-controlled.  Tolerating and compliant with medications.      Past Medical History, Past Surgical History, Family history, Social History, and Medications were all reviewed with the patient today and updated as necessary.           Current Outpatient Medications:     nitroGLYCERIN (NITROSTAT) 0.4 MG SL tablet, Place 1 tablet under the tongue as needed for Chest pain, Disp: 25 tablet, Rfl: 2    rOPINIRole (REQUIP) 0.25 MG tablet, 1-2 tabs PO QHS prn restless legs, Disp: 180 tablet, Rfl: 2    potassium chloride (KLOR-CON M) 10 MEQ extended release tablet, Take 1 tablet by mouth daily, Disp: 90 tablet, Rfl: 3    lisinopril (PRINIVIL;ZESTRIL) 20 MG tablet, Take 1 tablet by mouth daily, Disp: 90 tablet, Rfl: 3    furosemide (LASIX) 40 MG tablet, Take 1 tablet by mouth daily, Disp: 90 tablet, Rfl: 3    carvedilol (COREG) 25 MG tablet, TAKE 1 TABLET BY MOUTH TWICE DAILY WITH MEALS, Disp: 180

## 2024-07-05 ENCOUNTER — HOSPITAL ENCOUNTER (OUTPATIENT)
Dept: CT IMAGING | Age: 59
Discharge: HOME | End: 2024-07-05
Attending: INTERNAL MEDICINE

## 2024-07-05 DIAGNOSIS — I71.21 ANEURYSM OF ASCENDING AORTA WITHOUT RUPTURE (HCC): ICD-10-CM

## 2024-07-05 LAB — CREAT BLD-MCNC: 1 MG/DL (ref 0.8–1.5)

## 2024-07-05 PROCEDURE — 6360000004 HC RX CONTRAST MEDICATION: Performed by: INTERNAL MEDICINE

## 2024-07-05 PROCEDURE — 71275 CT ANGIOGRAPHY CHEST: CPT

## 2024-07-05 PROCEDURE — 71275 CT ANGIOGRAPHY CHEST: CPT | Performed by: RADIOLOGY

## 2024-07-05 PROCEDURE — 82565 ASSAY OF CREATININE: CPT

## 2024-07-05 RX ADMIN — IOPAMIDOL 100 ML: 755 INJECTION, SOLUTION INTRAVENOUS at 07:55

## 2024-07-11 ENCOUNTER — TELEPHONE (OUTPATIENT)
Age: 59
End: 2024-07-11

## 2024-07-11 DIAGNOSIS — R91.1 PULMONARY NODULE: Primary | ICD-10-CM

## 2024-07-11 NOTE — RESULT ENCOUNTER NOTE
Please call patient.  His CTA shows that his aorta is dilated at 5 cm.  Will need to follow this with serial CT scans.  It did also see an area of uncertain significance in his lung that looks like may be a nodule.  I discussed this with pulmonary and they felt that he should be referred to their office to follow-up.  Please arrange follow-up with [facility] and preferably Aleyda Dean  Thank you

## 2024-07-11 NOTE — TELEPHONE ENCOUNTER
Spoke with daughter, DARLIN Richardson, results given of CTA. Debra will talk with patient. Referral sent to [facility]//[place]    ----- Message from Adolph James MD sent at 7/11/2024  8:01 AM EDT -----  Please call patient.  His CTA shows that his aorta is dilated at 5 cm.  Will need to follow this with serial CT scans.  It did also see an area of uncertain significance in his lung that looks like may be a nodule.  I discussed this with pulmonary and they felt that he should be referred to their office to follow-up.  Please arrange follow-up with [facility] and preferably Aleyda Dean  Thank you

## 2024-07-25 ENCOUNTER — TELEPHONE (OUTPATIENT)
Dept: PULMONOLOGY | Age: 59
End: 2024-07-25

## 2024-07-25 ENCOUNTER — OFFICE VISIT (OUTPATIENT)
Dept: PULMONOLOGY | Age: 59
End: 2024-07-25
Payer: COMMERCIAL

## 2024-07-25 VITALS
HEIGHT: 73 IN | HEART RATE: 70 BPM | BODY MASS INDEX: 37.51 KG/M2 | SYSTOLIC BLOOD PRESSURE: 156 MMHG | OXYGEN SATURATION: 98 % | RESPIRATION RATE: 14 BRPM | WEIGHT: 283 LBS | DIASTOLIC BLOOD PRESSURE: 81 MMHG

## 2024-07-25 DIAGNOSIS — R91.1 PULMONARY NODULE: Primary | ICD-10-CM

## 2024-07-25 LAB
EXPIRATORY TIME: NORMAL
FEF 25-75% %PRED-PRE: NORMAL
FEF 25-75% PRED: NORMAL
FEF 25-75-PRE: NORMAL
FEV1 %PRED-PRE: 54 %
FEV1 PRED: 3.89 L
FEV1/FVC %PRED-PRE: NORMAL
FEV1/FVC PRED: NORMAL
FEV1/FVC: 69 %
FEV1: 2.09 L
FVC %PRED-PRE: 59 %
FVC PRED: 5.08 L
FVC: 3.02 L
PEF %PRED-PRE: NORMAL
PEF PRED: NORMAL
PEF-PRE: NORMAL

## 2024-07-25 PROCEDURE — 99204 OFFICE O/P NEW MOD 45 MIN: CPT | Performed by: INTERNAL MEDICINE

## 2024-07-25 PROCEDURE — 94010 BREATHING CAPACITY TEST: CPT | Performed by: INTERNAL MEDICINE

## 2024-07-25 PROCEDURE — 3079F DIAST BP 80-89 MM HG: CPT | Performed by: INTERNAL MEDICINE

## 2024-07-25 PROCEDURE — 3077F SYST BP >= 140 MM HG: CPT | Performed by: INTERNAL MEDICINE

## 2024-07-25 ASSESSMENT — PULMONARY FUNCTION TESTS
FEV1_PERCENT_PREDICTED_PRE: 54
FVC_PREDICTED: 5.08
FEV1/FVC: 69
FEV1_PREDICTED: 3.89
FVC_PERCENT_PREDICTED_PRE: 59
FVC: 3.02
FEV1: 2.09

## 2024-07-25 NOTE — PROGRESS NOTES
Name:  Navi Salinas  YOB: 1965   MRN: [id number]      Office Visit: 7/25/2024       Assessment & Plan    (Medical Decision Making)    Impression: 59 y.o. male left upper lobe nodule    1. Pulmonary nodule  -I have personally reviewed all the images.  He has left upper lobe nodule which measures 8 some 24 mm in the long axis.  I have discussed different options with him and I believe PET scan is the most appropriate.  [facility] Risk assessment -35% risk  of lung cancer  -I will order PET scan once the results are done I will discuss the results of it with him.  Further plan will depends on the results of the PET scan he may need follow-up CT scan if PET scan is negative if it is positive bronchoscopy with biopsies may be difficult because of location       - Spirometry Without Bronchodilator; Future    No orders of the defined types were placed in this encounter.    No orders of the defined types were placed in this encounter.      JARRED TORRES MA      No specialty comments available.    No problem-specific Assessment & Plan notes found for this encounter.             Total time for encounter on day of encounter was 50 minutes.  This time includes chart prep, review of tests/procedures, review of other provider's notes, documentation and counseling patient regarding disease process and medications.   _________________________________________________________________________    HISTORY OF PRESENT ILLNESS:    Mr. Navi Salinas is a 59 y.o. male who is seen in the referral of Dr. Shetty for the evaluation of lung nodule.  Patient has no prior history of lung problems.  Patient had CT scan for evaluation of aortic aneurysm and accidentally he was found to have left upper lobe nodule which measures 8 x 24 mm.  Patient denies any lung problems.  He denies shortness of breath cough or wheezing.  He has never smoked.  He has family history of lung cancer his mother just 2 years ago was

## 2024-07-25 NOTE — TELEPHONE ENCOUNTER
Note:  Ref by Adolph James MD for pulmonary nodule. CT chest 7/5/24 :   IMPRESSION:  1. Dilated aortic root.  2. Tubular appearing density of the left upper lobe apex. Consider PET/CT or  follow-up imaging to ensure stability.      Patient is a never smoker. No prior chest imaging in [facility] or [facility] for comparison.

## 2024-07-31 ENCOUNTER — COMMUNITY OUTREACH (OUTPATIENT)
Dept: FAMILY MEDICINE CLINIC | Facility: CLINIC | Age: 59
End: 2024-07-31

## 2024-08-19 ENCOUNTER — HOSPITAL ENCOUNTER (OUTPATIENT)
Dept: PET IMAGING | Age: 59
Discharge: HOME | End: 2024-08-22
Payer: COMMERCIAL

## 2024-08-19 DIAGNOSIS — R91.1 PULMONARY NODULE: ICD-10-CM

## 2024-08-19 LAB
GLUCOSE BLD STRIP.AUTO-MCNC: 143 MG/DL (ref 65–100)
SERVICE CMNT-IMP: ABNORMAL

## 2024-08-19 PROCEDURE — 78815 PET IMAGE W/CT SKULL-THIGH: CPT

## 2024-08-19 PROCEDURE — 2580000003 HC RX 258: Performed by: INTERNAL MEDICINE

## 2024-08-19 PROCEDURE — 82962 GLUCOSE BLOOD TEST: CPT

## 2024-08-19 PROCEDURE — 3430000000 HC RX DIAGNOSTIC RADIOPHARMACEUTICAL: Performed by: INTERNAL MEDICINE

## 2024-08-19 PROCEDURE — 6360000004 HC RX CONTRAST MEDICATION: Performed by: INTERNAL MEDICINE

## 2024-08-19 PROCEDURE — A9609 HC RX DIAGNOSTIC RADIOPHARMACEUTICAL: HCPCS | Performed by: INTERNAL MEDICINE

## 2024-08-19 RX ORDER — SODIUM CHLORIDE 0.9 % (FLUSH) 0.9 %
20 SYRINGE (ML) INJECTION AS NEEDED
Status: DISCONTINUED | OUTPATIENT
Start: 2024-08-19 | End: 2024-08-23 | Stop reason: HOSPADM

## 2024-08-19 RX ORDER — FLUDEOXYGLUCOSE F 18 200 MCI/ML
13.4 INJECTION, SOLUTION INTRAVENOUS
Status: COMPLETED | OUTPATIENT
Start: 2024-08-19 | End: 2024-08-19

## 2024-08-19 RX ADMIN — FLUDEOXYGLUCOSE F 18 13.4 MILLICURIE: 200 INJECTION, SOLUTION INTRAVENOUS at 07:34

## 2024-08-19 RX ADMIN — DIATRIZOATE MEGLUMINE AND DIATRIZOATE SODIUM 10 ML: 660; 100 LIQUID ORAL; RECTAL at 07:34

## 2024-08-19 RX ADMIN — SODIUM CHLORIDE, PRESERVATIVE FREE 20 ML: 5 INJECTION INTRAVENOUS at 07:34

## 2024-10-04 NOTE — PROGRESS NOTES
Patient discharged from hospital by RN via wheelchair. Wound care nurse consult for RLE wound.    56 y/o male admitted for lower abdominal pain.  Past Medical History:   Diagnosis Date    Asthma     Chronic venous insufficiency     Diabetes (HCC)     Lower leg edema     Prostate cancer (HCC)     gets chemo at [facility]     Chronic venous stasis and  follows as an outpatient at a wound care clinic.([facility] vs [place])  Patient unable to recall what they put on his wounds.  Staff nurse just placed dressing to RLE  as ordered by NP and Np ordered wound culture and blood culture which staff nurse did also.    RLE     Recommend and just done by staff nurse.    RLE shin area wound: MWF, cleanse wound with NS moist 4x4, cover wound with a piece of Xeroform gauze, ABD pad and secure with benigno. Elevate legs in and out of bed to reduce edema  Float heels    ELIO BETANCOURT RN, CWON

## 2024-10-26 DIAGNOSIS — G25.81 RESTLESS LEG SYNDROME: ICD-10-CM

## 2024-10-28 RX ORDER — ROPINIROLE 0.25 MG/1
TABLET, FILM COATED ORAL
Qty: 180 TABLET | Refills: 2 | OUTPATIENT
Start: 2024-10-28

## 2024-12-10 NOTE — PROGRESS NOTES
[facility], [address], [address]  [place] [address]  PHONE: [phone]    Navi Salinas  1965      SUBJECTIVE:   Navi Salinas is a 59 y.o. male seen for a follow up visit regarding the following:     Chief Complaint   Patient presents with    Congestive Heart Failure    Coronary Artery Disease    Hypertension       HPI:    Navi Salinas presents for routine follow up. Multiple issues addressed.     CHRONIC SYSTOLIC HEART FAILURE: Recovered EF  Status: Patient denies any PND, orthopnea or edema.  Last echo with normal LV function.  Tolerating carvedilol and lisinopril.  Medications:    B-Blocker:  Coreg 25 mg twice daily.  ACE/ARB/ARNI:  Lisinopril 20 mg daily.  MRA:  None  SGLT2i:  None  Diuretic: Lasix 40 mg daily  Prior History:    Echo (3/1/22):  EF 30-35%.  Global HK.  Moderate AR.  Mild/moderate MR. Mildly dilated Aortic root.    Echo (5/28/2024): EF 55 to 60%.  Mild AR/MR.  Mild dilatation of ascending aorta.  Max 4.5 cm at sinuses.       Coronary Artery Disease  Status:  Patient denies any recent angina.  Notes compliance with medical therapy.  No recent NTG use.  No intolerance to anti-platelet therapy. No blood in stool or melena.   Medications:  Aspirin 81 mg  Prior History:     PCI of Right PDA (3/21/22):  2.5 x 15 mm Resolute [place] HADLEY. Mild disease in other segments.  EF 35-40%.      Ascending Aortic Aneurysm  Status: Most recent CT scan showed findings as outlined below.  This showed lung nodule.  Referred to [facility] and was seen on 7/25.  PET scan ordered.  Showed Stable sized, not avid pulmonary nodule.  He states he was not contacted with results from his pulmonologist.  Prior History:     CTA (7/5/2024): Aorta: Sinuses 5.0.  STJ 3.7.  Ascending aorta 4.1.    Hypertension  Status:  Ambulatory BP readings have been controlled.  Patient reports compliance with medical therapy without side effects.    Medications: See CHF

## 2024-12-11 ENCOUNTER — TELEPHONE (OUTPATIENT)
Age: 59
End: 2024-12-11

## 2024-12-11 ENCOUNTER — TELEPHONE (OUTPATIENT)
Dept: PULMONOLOGY | Age: 59
End: 2024-12-11

## 2024-12-11 ENCOUNTER — OFFICE VISIT (OUTPATIENT)
Age: 59
End: 2024-12-11
Payer: COMMERCIAL

## 2024-12-11 VITALS
DIASTOLIC BLOOD PRESSURE: 88 MMHG | SYSTOLIC BLOOD PRESSURE: 130 MMHG | BODY MASS INDEX: 37.88 KG/M2 | WEIGHT: 285.8 LBS | HEART RATE: 80 BPM | HEIGHT: 73 IN

## 2024-12-11 DIAGNOSIS — E78.49 OTHER HYPERLIPIDEMIA: ICD-10-CM

## 2024-12-11 DIAGNOSIS — R93.89 ABNORMAL CHEST CT: ICD-10-CM

## 2024-12-11 DIAGNOSIS — I10 ESSENTIAL HYPERTENSION: ICD-10-CM

## 2024-12-11 DIAGNOSIS — I71.21 ANEURYSM OF ASCENDING AORTA WITHOUT RUPTURE (HCC): Primary | ICD-10-CM

## 2024-12-11 DIAGNOSIS — I50.22 CHRONIC SYSTOLIC CONGESTIVE HEART FAILURE (HCC): ICD-10-CM

## 2024-12-11 DIAGNOSIS — R91.1 PULMONARY NODULE: ICD-10-CM

## 2024-12-11 DIAGNOSIS — I25.10 CORONARY ARTERY DISEASE INVOLVING NATIVE CORONARY ARTERY OF NATIVE HEART WITHOUT ANGINA PECTORIS: ICD-10-CM

## 2024-12-11 DIAGNOSIS — R91.1 PULMONARY NODULE: Primary | ICD-10-CM

## 2024-12-11 LAB
ALBUMIN SERPL-MCNC: 3.5 G/DL (ref 3.2–4.6)
ALBUMIN/GLOB SERPL: 1.1 (ref 1–1.9)
ALP SERPL-CCNC: 106 U/L (ref 40–129)
ALT SERPL-CCNC: 33 U/L (ref 8–55)
ANION GAP SERPL CALC-SCNC: 10 MMOL/L (ref 7–16)
AST SERPL-CCNC: 26 U/L (ref 15–37)
BILIRUB SERPL-MCNC: 1.6 MG/DL (ref 0–1.2)
BUN SERPL-MCNC: 17 MG/DL (ref 8–23)
CALCIUM SERPL-MCNC: 9.1 MG/DL (ref 8.8–10.2)
CHLORIDE SERPL-SCNC: 101 MMOL/L (ref 98–107)
CHOLEST SERPL-MCNC: 127 MG/DL (ref 0–200)
CO2 SERPL-SCNC: 26 MMOL/L (ref 20–29)
CREAT SERPL-MCNC: 0.97 MG/DL (ref 0.8–1.3)
ERYTHROCYTE [DISTWIDTH] IN BLOOD BY AUTOMATED COUNT: 13 % (ref 11.9–14.6)
GLOBULIN SER CALC-MCNC: 3.1 G/DL (ref 2.3–3.5)
GLUCOSE SERPL-MCNC: 161 MG/DL (ref 70–99)
HCT VFR BLD AUTO: 42.4 % (ref 41.1–50.3)
HDLC SERPL-MCNC: 39 MG/DL (ref 40–60)
HDLC SERPL: 3.3 (ref 0–5)
HGB BLD-MCNC: 14.9 G/DL (ref 13.6–17.2)
LDLC SERPL CALC-MCNC: 71 MG/DL (ref 0–100)
MCH RBC QN AUTO: 30.6 PG (ref 26.1–32.9)
MCHC RBC AUTO-ENTMCNC: 35.1 G/DL (ref 31.4–35)
MCV RBC AUTO: 87.1 FL (ref 82–102)
NRBC # BLD: 0 K/UL (ref 0–0.2)
PLATELET # BLD AUTO: 202 K/UL (ref 150–450)
PMV BLD AUTO: 9.8 FL (ref 9.4–12.3)
POTASSIUM SERPL-SCNC: 4.6 MMOL/L (ref 3.5–5.1)
PROT SERPL-MCNC: 6.6 G/DL (ref 6.3–8.2)
RBC # BLD AUTO: 4.87 M/UL (ref 4.23–5.6)
SODIUM SERPL-SCNC: 136 MMOL/L (ref 136–145)
TRIGL SERPL-MCNC: 89 MG/DL (ref 0–150)
VLDLC SERPL CALC-MCNC: 18 MG/DL (ref 6–23)
WBC # BLD AUTO: 7.6 K/UL (ref 4.3–11.1)

## 2024-12-11 PROCEDURE — 99214 OFFICE O/P EST MOD 30 MIN: CPT | Performed by: INTERNAL MEDICINE

## 2024-12-11 PROCEDURE — 3079F DIAST BP 80-89 MM HG: CPT | Performed by: INTERNAL MEDICINE

## 2024-12-11 PROCEDURE — 3075F SYST BP GE 130 - 139MM HG: CPT | Performed by: INTERNAL MEDICINE

## 2024-12-11 RX ORDER — CARVEDILOL 25 MG/1
TABLET ORAL
Qty: 180 TABLET | Refills: 3 | Status: SHIPPED | OUTPATIENT
Start: 2024-12-11

## 2024-12-11 RX ORDER — NITROGLYCERIN 0.4 MG/1
0.4 TABLET SUBLINGUAL PRN
Qty: 25 TABLET | Refills: 2 | Status: SHIPPED | OUTPATIENT
Start: 2024-12-11

## 2024-12-11 RX ORDER — FUROSEMIDE 40 MG/1
40 TABLET ORAL DAILY
Qty: 90 TABLET | Refills: 3 | Status: SHIPPED | OUTPATIENT
Start: 2024-12-11

## 2024-12-11 RX ORDER — ATORVASTATIN CALCIUM 40 MG/1
40 TABLET, FILM COATED ORAL DAILY
Qty: 90 TABLET | Refills: 3 | Status: SHIPPED | OUTPATIENT
Start: 2024-12-11

## 2024-12-11 RX ORDER — POTASSIUM CHLORIDE 750 MG/1
10 TABLET, EXTENDED RELEASE ORAL DAILY
Qty: 90 TABLET | Refills: 3 | Status: SHIPPED | OUTPATIENT
Start: 2024-12-11

## 2024-12-11 RX ORDER — LISINOPRIL 20 MG/1
20 TABLET ORAL DAILY
Qty: 90 TABLET | Refills: 3 | Status: SHIPPED | OUTPATIENT
Start: 2024-12-11

## 2024-12-11 ASSESSMENT — ENCOUNTER SYMPTOMS: SHORTNESS OF BREATH: 0

## 2024-12-11 NOTE — TELEPHONE ENCOUNTER
Pt was seen by Dr. James and he reached out to me concerned that pt has not heard anything about the PET scan. I reviewed the PET scan and CT scan with Dr. Dominguez. He recommends that pt have repeat Chest CT which I will order now.   Can you please set pt up a follow up appt with Dr. Dominguez? I have left a voicemail on pt's voicemail to make him aware of the CT order and the need for follow up.    Thank you!

## 2024-12-11 NOTE — TELEPHONE ENCOUNTER
----- Message from Dr. Adolph James MD sent at 12/11/2024 11:31 AM EST -----  Can you let him know I reached out to pulmonary and they said they would arrange follow-up for him with a different provider.  If he does not hear anything in the next week please let me know

## 2024-12-13 NOTE — TELEPHONE ENCOUNTER
Left several messages for patient to return call. No response. Sent Identify message with results//Ludiab

## 2025-01-09 ENCOUNTER — TELEPHONE (OUTPATIENT)
Age: 60
End: 2025-01-09

## 2025-01-09 NOTE — TELEPHONE ENCOUNTER
Left message on voice mail to call pulmonary, or myself about his appointment for CT scan//brendab

## 2025-01-28 DIAGNOSIS — G25.81 RESTLESS LEG SYNDROME: ICD-10-CM

## 2025-01-31 RX ORDER — ROPINIROLE 0.25 MG/1
TABLET, FILM COATED ORAL
Qty: 180 TABLET | Refills: 2 | OUTPATIENT
Start: 2025-01-31

## 2025-04-14 DIAGNOSIS — G25.81 RESTLESS LEG SYNDROME: ICD-10-CM

## 2025-04-15 RX ORDER — ROPINIROLE 0.25 MG/1
TABLET, FILM COATED ORAL
Qty: 180 TABLET | Refills: 2 | OUTPATIENT
Start: 2025-04-15

## (undated) DEVICE — DEVICE COMPR LNG 27 CM VASC BND

## (undated) DEVICE — Device: Brand: PROWATER

## (undated) DEVICE — CATH ANGI BLLN DIL 2.5X08MM -- NC EUPHORA

## (undated) DEVICE — CATH GUID .056IN 6FR 150CM -- GUIDELINER V3

## (undated) DEVICE — GLIDESHEATH SLENDER STAINLESS STEEL KIT: Brand: GLIDESHEATH SLENDER

## (undated) DEVICE — GUIDEWIRE 035IN 210CM PTFE COAT FIX COR J TIP 15MM FIRM BODY

## (undated) DEVICE — CATHETER DIAG AD 5FR L110CM 145DEG COR GRY HYDRPHLC NYL

## (undated) DEVICE — RADIFOCUS OPTITORQUE ANGIOGRAPHIC CATHETER: Brand: OPTITORQUE

## (undated) DEVICE — CATHETER GUID 6FR L100CM GRN PTFE JR4 TRUELUMEN HYBRID

## (undated) DEVICE — CATH BLLN ANGIO 2.25X15MM SC EUPHORA RX